# Patient Record
Sex: MALE | Race: OTHER | NOT HISPANIC OR LATINO | Employment: UNEMPLOYED | URBAN - METROPOLITAN AREA
[De-identification: names, ages, dates, MRNs, and addresses within clinical notes are randomized per-mention and may not be internally consistent; named-entity substitution may affect disease eponyms.]

---

## 2022-09-21 DIAGNOSIS — M54.50 CHRONIC LUMBAR PAIN: Primary | ICD-10-CM

## 2022-09-21 DIAGNOSIS — G89.29 CHRONIC LUMBAR PAIN: Primary | ICD-10-CM

## 2022-09-23 PROBLEM — M54.50 CHRONIC LUMBAR PAIN: Status: ACTIVE | Noted: 2022-09-23

## 2022-09-23 PROBLEM — G89.29 CHRONIC LUMBAR PAIN: Status: ACTIVE | Noted: 2022-09-23

## 2022-09-23 NOTE — PROGRESS NOTES
[facility]  Physical Therapy Initial Evaluation    Name: Leni White  [facility] Number: [id number]    Therapy Diagnosis:   Encounter Diagnosis   Name Primary?    Chronic low back pain, unspecified back pain laterality, unspecified whether sciatica present Yes     Physician: Kg Bowman FNP    Physician Orders: PT Eval and Treat  Medical Diagnosis from Referral: Chronic low back pain  Evaluation Date: 9/26/2022  Authorization Period Expiration: Per insurance approval  Plan of Care Expiration: 12/26/2022  Visit # / Visits authorized: 0/ Per insurance approval    Time In: 1108  Time Out: 1205  Total Appointment Time (timed & untimed codes): 57 minutes  Total Treatment time (time-based codes) separate from Evaluation: 27 minutes    Surgery: None  Orthopedic Precautions: None  Pertinent History: GERD;  has been told be three different ortho MD that surgical intervention is necessary    Subjective     Ly reports: Per chart review, referred over to PT for 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 Was also referred to neurosurgery however they did not accept his insurance, thus they are currently seeking alternate provider that does accept his insurance. Leg weakness has led to falls, thus was ordered a cane for stability by medical provider.    Subjective report today:  has been having neck and low back pain for years; vary in degree of severity, however today low back is most pressing. Most recent fall was 2 weeks ago;  has been falling for quite some time 2/2 weakness and pain in legs (left >right) thus medical provider ordered him an assistive device and he now uses a straight cane. Admits to tingling in both LUE and LLE, sometimes to right side as well however a lot less often and severe compared to left. Reports he is frequently dropping items, has decreased standing tolerance to average 5-10 minutes (varied based on his pain level), difficulty sleeping (sleeps  rotating sidelying, prone; unable to sleep supine 2/2 pain), headaches. Sometimes uses a brace-like device with ornelas/cold pack for his low back and that seems to help reduce pain. Has home cervical traction device, which he states is sometimes tolerable vs other times too painful. States his neck just feels heavy and hard to hold up. States he was told by MD that he has a lumbar disc sequestration and needs both cervical and lumbar spinal surgery but he does not want surgical intervention. Admits to intermittent dizziness that is onset by his headache and neck pain radiate further up his neck, blurry vision during high allergies; denies dysphagia, dysarthria, drop attacks, and saddle paresthesia. States he has 10# unintentional weight loss this year 2/2 loss of appetite lack of taste to food.     Medical History:   No past medical history on file.    Surgical History:   Leni White  has no past surgical history on file.    Medications:   Leni currently has no medications in their medication list.    Allergies:   Review of patient's allergies indicates:  Not on File     Imaging - States has had imaging done, will bring to PT session    Outcome Measure:  Eval: MDQ: 69.6% disability    CMS Impairment/Limitation/Restriction for FOTO Survey    Therapist reviewed FOTO scores for Leni White on 9/26/2022.   FOTO documents entered into [employer] - see Media section.    Limitation Score: 76% (24 score)                              Category: Body Position    Objective     Gait Analysis: use of straight cane with RUE, antalgic gait to LLE     Palpation    Tender to mild palpatory pressure left (scapula @ muscle belly likely RTC and traps, medial scapular border). Right same as above however less severe than left       Dermatomes    RUE and RLE intact to light touch. LUE and LLE decreased sensation to light touch     Reflexes    Babinkski: absent bilaterally  Right: patellar: 1+ and Achilles: 2+  Left: patellar: 2+ and Achilles: 2+      Myotomes    Right - knee flexion 5/5, knee extension 4/5, dorsiflexion 4/5, shoulder scaption 3/5 within arc of motion    Left - knee flexion 4/5, knee extension 2/5, dorsiflexion 3/5, shoulder scaption 2/5       AROM    Cervical spine limited bilateral lateral rotation (right more limited than left), bilateral lateral flexion. Left-sided neck pain induced with flexion, bilateral rotation, left lateral flexion, and extension. Right-sided neck pain induced with right lateral flexion. Central neck pain induced with flexion.    Lumbar spine not formally tested 2/2 high irritability and pain, however with LTR moderate to severe limitation going towards right (left spinal limitation)      Right shoulder 90 degrees active scaption, 100 degrees passive scaption, positive to all RTC testing, positive to resisted elbow flexion    Left shoulder 40 degrees active scaption, ~70-80 degrees passive scaption, positive to all RTC testing       Passive Accessory    -Cervical: pain with left lateral glides and overall decreased mobility    -Lumbar: deferred 2/2 high irritability and overall high pain level     Special Tests    SLR positive left for hamstring and calf tightness, may have some neural tension    RTC testing positive bilateral shoulders           TREATMENT     Ly received the treatments listed below:       Time Activities   Manual     TherAct     TherEx     Gait     Neuro Re-ed     Modalities     E-Stim     Dry Needling     Canalith Repositioning         Home Exercises and Patient Education Provided    Education provided:   -Plan of care, HEP, activity modification    Written Home Exercises Provided: yes.  Exercises were reviewed and Ly was able to demonstrate them prior to the end of the session.  Ly demonstrated fair  understanding of the education provided.     See EMR under Media for exercises provided 9/26/2022.    Assessment   Ly is a 47 y.o. male referred to outpatient Physical Therapy with a medical diagnosis  of low back and neck pain with radiculopathy. Patient presents with significant functional decline 2/2 weakness, impaired balance, high fall risk, and pain. Patient will benefit from skilled outpatient Physical Therapy to address the deficits stated above, provide education, and to maximize patient's level of independence.     Patient prognosis is Fair.     Plan of care discussed with patient: Yes  Patient's spiritual, cultural and educational needs considered and patient is agreeable to the plan of care and goals as stated below:     Anticipated Barriers for therapy: Language barrier    Goals:  Short Term Goals: 6 weeks   Patient will report at least 10% disability reduction on MDQ to indicate clinically significant functional improvement  Patient will be able to ambulate with antalgic gait patter for full day using assistive device  Patient will report at least 10 point increase on FOTO survey to indicate clinically significant functional improvement    Long Term Goals: 12 weeks   Patient will report at least 20% disability reduction on MDQ to indicate clinically significant functional improvement  Patient will progress ambulation to device-free for half day each day  Patient will report at least 20 point increase on FOTO survey to indicate clinically significant functional improvement    Plan   Plan of care Certification: 9/26/2022 to 12/26/2022.    Outpatient Physical Therapy 2-3 times weekly for 12 weeks to include the following interventions: Cervical/Lumbar Traction, Gait Training, Manual Therapy, Moist Heat/ Ice, Neuromuscular Re-ed, Patient Education, Self Care, Therapeutic Activities, and Therapeutic Exercise.     Walter Aaron, PT

## 2022-09-26 ENCOUNTER — CLINICAL SUPPORT (OUTPATIENT)
Dept: REHABILITATION | Facility: HOSPITAL | Age: 47
End: 2022-09-26
Payer: MEDICAID

## 2022-09-26 DIAGNOSIS — G89.29 CHRONIC LOW BACK PAIN, UNSPECIFIED BACK PAIN LATERALITY, UNSPECIFIED WHETHER SCIATICA PRESENT: Primary | ICD-10-CM

## 2022-09-26 DIAGNOSIS — M54.50 CHRONIC LOW BACK PAIN, UNSPECIFIED BACK PAIN LATERALITY, UNSPECIFIED WHETHER SCIATICA PRESENT: Primary | ICD-10-CM

## 2022-09-26 PROCEDURE — 97163 PT EVAL HIGH COMPLEX 45 MIN: CPT

## 2022-09-30 ENCOUNTER — CLINICAL SUPPORT (OUTPATIENT)
Dept: REHABILITATION | Facility: HOSPITAL | Age: 47
End: 2022-09-30
Payer: MEDICAID

## 2022-09-30 DIAGNOSIS — G89.29 CHRONIC NECK AND BACK PAIN: Primary | ICD-10-CM

## 2022-09-30 DIAGNOSIS — M54.2 CHRONIC NECK AND BACK PAIN: Primary | ICD-10-CM

## 2022-09-30 DIAGNOSIS — M54.9 CHRONIC NECK AND BACK PAIN: Primary | ICD-10-CM

## 2022-09-30 PROCEDURE — 97140 MANUAL THERAPY 1/> REGIONS: CPT

## 2022-09-30 PROCEDURE — 97110 THERAPEUTIC EXERCISES: CPT

## 2022-09-30 NOTE — PLAN OF CARE
Physical Therapy Treatment Note     Name: Leni White  Clinic Number: [id number]    Therapy Diagnosis: No diagnosis found.  Physician: Kg Bowman FNP    Visit Date: 9/30/2022    Physician Orders: PT Eval and Treat  Medical Diagnosis from Referral: Chronic low back pain  Evaluation Date: 9/26/2022  Authorization Period Expiration: 12/26/2022  Plan of Care Expiration: 12/26/2022  Visit # / Visits authorized: 1/24    Time In: 1206  Time Out: 1323  Total Billable Time: 77 minutes    Surgery: None  Orthopedic Precautions: None  Pertinent History: GERD; states has been told be three different ortho MD that surgical intervention is necessary    Subjective     Patient reports: Ambulating into clinic today with cane in right hand and significant left antalgic gait. Normally symptoms are left sided, however states he fell two weeks ago and right leg has been more painful than left. Wearing LSO.      CMS Impairment/Limitation/Restriction for FOTO Survey  Therapist reviewed FOTO scores for Leni White on 9/30/2022.   FOTO documents entered into [employer] - see Media section.    Eval Patient's Physical FS Primary Measure: 24  Eval Risk Adjusted Statistical FOTO: 45  Eval Limitation Score: 76%  Category: Body Position  Eval MDQ: 69.6% functional    Objective     Leni received the following treatment:     Time Activities   Manual 13 min Cervical manual traction, passive left hip stretch (HS, ER/IR groups, PF), passive bilateral leg extension for prone progression   TherAct     TherEx 64 min Prone on elbows, prone press ups (active, then passive, then passive with right closure), prone (HS curl, hip ext), LTR, SLR, supine clams, bridge, mechanical traction lumbar (32# high, 22# low; 4 step ramp up, 6 step ramp down)   Gait     Neuro Re-ed     Modalities     E-Stim     Dry Needling     Canalith Repositioning           Home Exercises Provided and Patient Education Provided     Education provided:   -Plan of care, HEP, pathology and  potential reasons for mechanical pain    Assessment     Prone progression or prone hip extension did not increase radicular symptoms, although prone right hamstring curl increased symptoms into RLE. Attempted mechanical lumbar traction, appears to have centralized symptoms to low back. Generalized exercises that promote spinal to help reduce stiffness/guarding, and improve functional mobility.    Patient prognosis is Fair.      Anticipated barriers to physical therapy: Language barrier    Goals: Ly Is progressing well towards his goals.  Short Term Goals: 6 weeks   Patient will report at least 10% disability reduction on MDQ to indicate clinically significant functional improvement  Patient will be able to ambulate with antalgic gait patter for full day using assistive device  Patient will report at least 10 point increase on FOTO survey to indicate clinically significant functional improvement     Long Term Goals: 12 weeks   Patient will report at least 20% disability reduction on MDQ to indicate clinically significant functional improvement  Patient will progress ambulation to device-free for half day each day  Patient will report at least 20 point increase on FOTO survey to indicate clinically significant functional improvement  Plan     2-3x/week x 12 weeks    Walter Aaron, PT

## 2022-10-03 ENCOUNTER — CLINICAL SUPPORT (OUTPATIENT)
Dept: REHABILITATION | Facility: HOSPITAL | Age: 47
End: 2022-10-03
Payer: MEDICAID

## 2022-10-03 DIAGNOSIS — M54.50 CHRONIC LOW BACK PAIN, UNSPECIFIED BACK PAIN LATERALITY, UNSPECIFIED WHETHER SCIATICA PRESENT: Primary | ICD-10-CM

## 2022-10-03 DIAGNOSIS — M54.2 CHRONIC NECK PAIN: ICD-10-CM

## 2022-10-03 DIAGNOSIS — R53.1 RIGHT SIDED WEAKNESS: ICD-10-CM

## 2022-10-03 DIAGNOSIS — G89.29 CHRONIC LOW BACK PAIN, UNSPECIFIED BACK PAIN LATERALITY, UNSPECIFIED WHETHER SCIATICA PRESENT: Primary | ICD-10-CM

## 2022-10-03 DIAGNOSIS — G89.29 CHRONIC NECK PAIN: ICD-10-CM

## 2022-10-03 PROCEDURE — 97530 THERAPEUTIC ACTIVITIES: CPT

## 2022-10-03 NOTE — PLAN OF CARE
Physical Therapy Treatment Note     Name: Leni White  Clinic Number: [id number]    Therapy Diagnosis:   Encounter Diagnoses   Name Primary?    Chronic low back pain, unspecified back pain laterality, unspecified whether sciatica present Yes    Chronic neck pain     Right sided weakness      Physician: Kg Bowman FNP    Visit Date: 10/3/2022    Physician Orders: PT Eval and Treat  Medical Diagnosis from Referral: Chronic low back pain  Evaluation Date: 9/26/2022  Authorization Period Expiration: 12/26/2022  Plan of Care Expiration: 12/26/2022  Visit # / Visits authorized: 2/24    Time In: 1100  Time Out: 1157  Total Billable Time: 57 minutes    Surgery: None  Orthopedic Precautions: None  Pertinent History: GERD; states has been told be three different ortho MD that surgical intervention is necessary    Subjective     Patient reports: Ambulating into clinic today with cane in right hand and significant left antalgic gait. Normally symptoms are left sided, however states he fell two weeks ago and right leg has been more painful than left. Wearing LSO.      CMS Impairment/Limitation/Restriction for FOTO Survey  Therapist reviewed FOTO scores for Leni White on 10/3/2022.   FOTO documents entered into [employer] - see Media section.    Eval Patient's Physical FS Primary Measure: 24  Eval Risk Adjusted Statistical FOTO: 45  Eval Limitation Score: 76%  Category: Body Position  Eval MDQ: 69.6% functional    Objective     Ly received the following treatment:     Time Activities   Manual  Cervical manual traction, passive left hip stretch (HS, ER/IR groups, PF), passive bilateral leg extension for prone progression   TherAct 57 min Lumbar mechanical traction, caregiver/patient education (40#high, 30# low; 3 step ramp up and down), Nustep   TherEx  Prone on elbows, prone press ups (active, then passive, then passive with right closure), prone (HS curl, hip ext), LTR, SLR, supine clams, bridge, mechanical traction lumbar (32#  high, 22# low; 4 step ramp up, 6 step ramp down)   Gait     Neuro Re-ed     Modalities     E-Stim     Dry Needling     Canalith Repositioning           Home Exercises Provided and Patient Education Provided     Education provided:   -Plan of care, HEP, pathology and potential reasons for mechanical pain, potential need for surgical intervention    Assessment     Extensive conversation regarding lumbar pathology and potential need for surgical intervention. Mr. 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 Has also tried spinal injection and gotten no relief from it. Continuing mechanical traction to help reduce spinal pressure; he is adamant of conservative measures and states he will consider surgery if he gets to the point of no longer being able to walk.    Patient prognosis is Fair.      Anticipated barriers to physical therapy: Language barrier    Goals: Leni Is progressing well towards his goals.  Short Term Goals: 6 weeks   Patient will report at least 10% disability reduction on MDQ to indicate clinically significant functional improvement  Patient will be able to ambulate with antalgic gait patter for full day using assistive device  Patient will report at least 10 point increase on FOTO survey to indicate clinically significant functional improvement     Long Term Goals: 12 weeks   Patient will report at least 20% disability reduction on MDQ to indicate clinically significant functional improvement  Patient will progress ambulation to device-free for half day each day  Patient will report at least 20 point increase on FOTO survey to indicate clinically  significant functional improvement  Plan     2-3x/week x 12 weeks    Walter Aaron, PT

## 2022-10-05 ENCOUNTER — CLINICAL SUPPORT (OUTPATIENT)
Dept: REHABILITATION | Facility: HOSPITAL | Age: 47
End: 2022-10-05
Payer: MEDICAID

## 2022-10-05 DIAGNOSIS — G89.29 CHRONIC NECK PAIN: ICD-10-CM

## 2022-10-05 DIAGNOSIS — M54.50 CHRONIC LOW BACK PAIN, UNSPECIFIED BACK PAIN LATERALITY, UNSPECIFIED WHETHER SCIATICA PRESENT: Primary | ICD-10-CM

## 2022-10-05 DIAGNOSIS — G89.29 CHRONIC LOW BACK PAIN, UNSPECIFIED BACK PAIN LATERALITY, UNSPECIFIED WHETHER SCIATICA PRESENT: Primary | ICD-10-CM

## 2022-10-05 DIAGNOSIS — M54.2 CHRONIC NECK PAIN: ICD-10-CM

## 2022-10-05 PROCEDURE — 97110 THERAPEUTIC EXERCISES: CPT

## 2022-10-05 PROCEDURE — 97140 MANUAL THERAPY 1/> REGIONS: CPT

## 2022-10-05 NOTE — PLAN OF CARE
Physical Therapy Treatment Note     Name: Leni White  Clinic Number: [id number]    Therapy Diagnosis:   Encounter Diagnoses   Name Primary?    Chronic low back pain, unspecified back pain laterality, unspecified whether sciatica present Yes    Chronic neck pain      Physician: Kg Bowman FNP    Visit Date: 10/5/2022    Physician Orders: PT Eval and Treat  Medical Diagnosis from Referral: Chronic low back pain  Evaluation Date: 9/26/2022  Authorization Period Expiration: 12/26/2022  Plan of Care Expiration: 12/26/2022  Visit # / Visits authorized: 3/24    Time In: 1058  Time Out: 1206  Total Billable Time: 68 minutes    Surgery: None  Orthopedic Precautions: None  Pertinent History: GERD; states has been told be three different ortho MD that surgical intervention is necessary    Subjective     Patient reports: No significant changes. Still feels low back pain in right radiating down left leg into left lateral calf.      CMS Impairment/Limitation/Restriction for FOTO Survey  Therapist reviewed FOTO scores for Leni White on 10/5/2022.   FOTO documents entered into [employer] - see Media section.    Eval Patient's Physical FS Primary Measure: 24  Eval Risk Adjusted Statistical FOTO: 45  Eval Limitation Score: 76%  Category: Body Position  Eval MDQ: 69.6% functional    Objective     Ly received the following treatment:     Time Activities   Manual 30 min Lumbar manual traction (stab ball), bilateral hamstring stretch (left with sciatic gliding/tensioning), left LE LAD manip, lumbar central PAs, lumbar MFR   TherAct  Lumbar mechanical traction, caregiver/patient education (40#high, 30# low; 3 step ramp up and down), Nustep   TherEx 38 min NuStep, MHP, prone press up assessment, DKTC, samurai core activation (90/90 hip knee flx, shld ext against knees), standing low back stretch, standing marching, HEP   Gait     Neuro Re-ed     Modalities     E-Stim     Dry Needling     Canalith Repositioning           Home Exercises  Provided and Patient Education Provided     Education provided:   -Plan of care, HEP (given and reviewed; see media section of EMR), pathology and potential reasons for mechanical pain, potential need for surgical intervention    Assessment     Flexion based activities centralize his symptoms, while extension based activities peripheralize symptoms to RLE. Has some short-lived relief with exercises/intervention, however right back to radicular symptoms once he stands upright and begins walking.     Extensive conversation regarding lumbar pathology and potential need for surgical intervention. Mr. 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 Has also tried spinal injection and gotten no relief from it. Continuing mechanical traction to help reduce spinal pressure; he is adamant of conservative measures and states he will consider surgery if he gets to the point of no longer being able to walk.    Patient prognosis is Fair.      Anticipated barriers to physical therapy: Language barrier    Goals: Leni Is progressing well towards his goals.  Short Term Goals: 6 weeks   Patient will report at least 10% disability reduction on MDQ to indicate clinically significant functional improvement  Patient will be able to ambulate with antalgic gait patter for full day using assistive device  Patient will report at least 10 point increase on FOTO survey to indicate clinically significant functional improvement     Long Term Goals: 12 weeks   Patient will report at least 20% disability reduction on MDQ to indicate clinically significant functional improvement  Patient will progress  ambulation to device-free for half day each day  Patient will report at least 20 point increase on FOTO survey to indicate clinically significant functional improvement  Plan     2-3x/week x 12 weeks    Walter Aaron, PT

## 2022-10-07 ENCOUNTER — CLINICAL SUPPORT (OUTPATIENT)
Dept: REHABILITATION | Facility: HOSPITAL | Age: 47
End: 2022-10-07
Payer: MEDICAID

## 2022-10-07 DIAGNOSIS — G89.29 CHRONIC LOW BACK PAIN, UNSPECIFIED BACK PAIN LATERALITY, UNSPECIFIED WHETHER SCIATICA PRESENT: Primary | ICD-10-CM

## 2022-10-07 DIAGNOSIS — M54.2 CHRONIC NECK PAIN: ICD-10-CM

## 2022-10-07 DIAGNOSIS — M54.50 CHRONIC LOW BACK PAIN, UNSPECIFIED BACK PAIN LATERALITY, UNSPECIFIED WHETHER SCIATICA PRESENT: Primary | ICD-10-CM

## 2022-10-07 DIAGNOSIS — G89.29 CHRONIC NECK PAIN: ICD-10-CM

## 2022-10-07 PROCEDURE — 97110 THERAPEUTIC EXERCISES: CPT

## 2022-10-07 PROCEDURE — 97140 MANUAL THERAPY 1/> REGIONS: CPT

## 2022-10-07 NOTE — PLAN OF CARE
Physical Therapy Treatment Note     Name: Leni White  Clinic Number: [id number]    Therapy Diagnosis:   Encounter Diagnoses   Name Primary?    Chronic low back pain, unspecified back pain laterality, unspecified whether sciatica present Yes    Chronic neck pain      Physician: Kg Bowman FNP    Visit Date: 10/7/2022    Physician Orders: PT Eval and Treat  Medical Diagnosis from Referral: Chronic low back pain  Evaluation Date: 9/26/2022  Authorization Period Expiration: 12/26/2022  Plan of Care Expiration: 12/26/2022  Visit # / Visits authorized: 4/24    Time In: 1055  Time Out: 1144  Total Billable Time: 49 minutes    Surgery: None  Orthopedic Precautions: None  Pertinent History: GERD; states has been told be three different ortho MD that surgical intervention is necessary    Subjective     Patient reports: No significant changes, however states right leg symptoms no longer radiate down entire leg but stop at the knee level. Had been doing a lot of home exercises. Biofreeze helped to give relief for a few hours.      CMS Impairment/Limitation/Restriction for FOTO Survey  Therapist reviewed FOTO scores for Leni White on 10/7/2022.   FOTO documents entered into [employer] - see Media section.    Eval Patient's Physical FS Primary Measure: 24  Eval Risk Adjusted Statistical FOTO: 45  Eval Limitation Score: 76%  Category: Body Position  Eval MDQ: 69.6% functional    Objective     Ly received the following treatment:     Time Activities   Manual 30 min Lumbar manual traction (stab ball), bilateral hamstring stretch (bilateral with sciatic gliding/tensioning), end range SKTC, Biofreeze low back and right scapula   TherAct  Lumbar mechanical traction, caregiver/patient education (40#high, 30# low; 3 step ramp up and down), Nustep   TherEx 19 min MHP, DKTC, stab ball pressdown, standing low back stretch, standing marching, HEP   Gait     Neuro Re-ed     Modalities     E-Stim     Dry Needling     Canalith Repositioning            Home Exercises Provided and Patient Education Provided     Education provided:   -Plan of care, HEP (given and reviewed; see media section of EMR), pathology and potential reasons for mechanical pain, potential need for surgical intervention    Assessment     Flexion based activities centralize his symptoms, while extension based activities peripheralize symptoms to RLE. Has some short-lived relief with exercises/intervention, however right back to radicular symptoms once he stands upright and begins walking.     Extensive conversation regarding lumbar pathology and potential need for surgical intervention. Mr. 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 Has also tried spinal injection and gotten no relief from it. Continuing mechanical traction to help reduce spinal pressure; he is adamant of conservative measures and states he will consider surgery if he gets to the point of no longer being able to walk.    Patient prognosis is Fair.      Anticipated barriers to physical therapy: Language barrier    Goals: Leni Is progressing well towards his goals.  Short Term Goals: 6 weeks   Patient will report at least 10% disability reduction on MDQ to indicate clinically significant functional improvement  Patient will be able to ambulate with antalgic gait patter for full day using assistive device  Patient will report at least 10 point increase on FOTO survey to indicate clinically significant functional improvement     Long Term Goals: 12 weeks   Patient will report at least 20% disability reduction on MDQ to indicate clinically significant functional  improvement  Patient will progress ambulation to device-free for half day each day  Patient will report at least 20 point increase on FOTO survey to indicate clinically significant functional improvement  Plan     2-3x/week x 12 weeks    Walter Aaron, PT

## 2022-10-10 ENCOUNTER — CLINICAL SUPPORT (OUTPATIENT)
Dept: REHABILITATION | Facility: HOSPITAL | Age: 47
End: 2022-10-10
Payer: MEDICAID

## 2022-10-10 DIAGNOSIS — M54.2 CHRONIC NECK PAIN: ICD-10-CM

## 2022-10-10 DIAGNOSIS — M54.50 CHRONIC LOW BACK PAIN, UNSPECIFIED BACK PAIN LATERALITY, UNSPECIFIED WHETHER SCIATICA PRESENT: Primary | ICD-10-CM

## 2022-10-10 DIAGNOSIS — R53.1 RIGHT SIDED WEAKNESS: ICD-10-CM

## 2022-10-10 DIAGNOSIS — G89.29 CHRONIC NECK PAIN: ICD-10-CM

## 2022-10-10 DIAGNOSIS — G89.29 CHRONIC LOW BACK PAIN, UNSPECIFIED BACK PAIN LATERALITY, UNSPECIFIED WHETHER SCIATICA PRESENT: Primary | ICD-10-CM

## 2022-10-10 PROCEDURE — 97140 MANUAL THERAPY 1/> REGIONS: CPT

## 2022-10-10 PROCEDURE — 97110 THERAPEUTIC EXERCISES: CPT

## 2022-10-10 NOTE — PLAN OF CARE
Physical Therapy Treatment Note     Name: Leni White  Clinic Number: [id number]    Therapy Diagnosis:   Encounter Diagnoses   Name Primary?    Chronic low back pain, unspecified back pain laterality, unspecified whether sciatica present Yes    Chronic neck pain     Right sided weakness      Physician: Kg Bowman FNP    Visit Date: 10/10/2022    Physician Orders: PT Eval and Treat  Medical Diagnosis from Referral: Chronic low back pain  Evaluation Date: 9/26/2022  Authorization Period Expiration: 12/26/2022  Plan of Care Expiration: 12/26/2022  Visit # / Visits authorized: 5/24    Time In: 1053  Time Out: 1142  Total Billable Time: 49 minutes    Surgery: None  Orthopedic Precautions: None  Pertinent History: GERD; states has been told be three different ortho MD that surgical intervention is necessary    Subjective     Patient reports: No significant changes, however states right leg symptoms no longer radiate down entire leg but stop at the calf/knee level. Has been doing a lot of home exercises, but the pain sometimes gets so bad that he has to stop.      CMS Impairment/Limitation/Restriction for FOTO Survey  Therapist reviewed FOTO scores for Leni White on 10/10/2022.   FOTO documents entered into [employer] - see Media section.    Eval Patient's Physical FS Primary Measure: 24  Eval Risk Adjusted Statistical FOTO: 45  Eval Limitation Score: 76%  Category: Body Position  Eval MDQ: 69.6% functional    Objective     Ly received the following treatment:     Time Activities   Manual 20 min Lumbar manual traction (stab ball), bilateral hamstring stretch (right with sciatic gliding/tensioning), passive LTR (stab ball)   TherAct  Lumbar mechanical traction, caregiver/patient education (40#high, 30# low; 3 step ramp up and down), Nustep   TherEx 29 min NuStep, MHP, DKTC, stab ball pressdown   Gait     Neuro Re-ed     Modalities     E-Stim     Dry Needling     Canalith Repositioning           Home Exercises Provided and  Patient Education Provided     Education provided:   -Plan of care, HEP (given and reviewed; see media section of EMR), pathology and potential reasons for mechanical pain, potential need for surgical intervention    Assessment     Flexion based activities centralize his symptoms, while extension based activities peripheralize symptoms to RLE. Has some short-lived relief with exercises/intervention, however right back to radicular symptoms once he stands upright and begins walking.     Extensive conversation regarding lumbar pathology and potential need for surgical intervention. Mr. 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 Has also tried spinal injection and gotten no relief from it. Continuing mechanical traction to help reduce spinal pressure; he is adamant of conservative measures and states he will consider surgery if he gets to the point of no longer being able to walk.    Patient prognosis is Fair.      Anticipated barriers to physical therapy: Language barrier    Goals: Leni Is progressing well towards his goals.  Short Term Goals: 6 weeks   Patient will report at least 10% disability reduction on MDQ to indicate clinically significant functional improvement  Patient will be able to ambulate with antalgic gait patter for full day using assistive device  Patient will report at least 10 point increase on FOTO survey to indicate clinically significant functional improvement     Long Term Goals: 12 weeks   Patient will report at least 20% disability reduction on MDQ to indicate clinically significant functional improvement  Patient will progress ambulation to  device-free for half day each day  Patient will report at least 20 point increase on FOTO survey to indicate clinically significant functional improvement  Plan     2-3x/week x 12 weeks    Walter Aaron, PT

## 2022-10-12 ENCOUNTER — CLINICAL SUPPORT (OUTPATIENT)
Dept: REHABILITATION | Facility: HOSPITAL | Age: 47
End: 2022-10-12
Payer: MEDICAID

## 2022-10-12 DIAGNOSIS — R53.1 RIGHT SIDED WEAKNESS: ICD-10-CM

## 2022-10-12 DIAGNOSIS — G89.29 CHRONIC LOW BACK PAIN, UNSPECIFIED BACK PAIN LATERALITY, UNSPECIFIED WHETHER SCIATICA PRESENT: Primary | ICD-10-CM

## 2022-10-12 DIAGNOSIS — M54.50 CHRONIC LOW BACK PAIN, UNSPECIFIED BACK PAIN LATERALITY, UNSPECIFIED WHETHER SCIATICA PRESENT: Primary | ICD-10-CM

## 2022-10-12 DIAGNOSIS — M54.2 CHRONIC NECK PAIN: ICD-10-CM

## 2022-10-12 DIAGNOSIS — G89.29 CHRONIC NECK PAIN: ICD-10-CM

## 2022-10-12 PROCEDURE — 97110 THERAPEUTIC EXERCISES: CPT

## 2022-10-12 NOTE — PLAN OF CARE
Physical Therapy Treatment Note     Name: Leni White  Clinic Number: [id number]    Therapy Diagnosis:   Encounter Diagnoses   Name Primary?    Chronic low back pain, unspecified back pain laterality, unspecified whether sciatica present Yes    Chronic neck pain     Right sided weakness      Physician: Kg Bowman FNP    Visit Date: 10/12/2022    Physician Orders: PT Eval and Treat  Medical Diagnosis from Referral: Chronic low back pain  Evaluation Date: 9/26/2022  Authorization Period Expiration: 12/26/2022  Plan of Care Expiration: 12/26/2022  Visit # / Visits authorized: 6/24    Time In: 1055  Time Out: 1145  Total Billable Time: 50 minutes    Surgery: None  Orthopedic Precautions: None  Pertinent History: GERD; states has been told be three different ortho MD that surgical intervention is necessary    Subjective     Patient reports: No significant changes, however states right leg symptoms no longer radiate down entire leg but stop at the calf/knee level. Pain is more concentrated to central lower back, and worsening in severity at that location.      CMS Impairment/Limitation/Restriction for FOTO Survey  Therapist reviewed FOTO scores for Leni White on 10/12/2022.   FOTO documents entered into [employer] - see Media section.    Eval Patient's Physical FS Primary Measure: 24  Eval Risk Adjusted Statistical FOTO: 45  Eval Limitation Score: 76%  Category: Body Position  Eval MDQ: 69.6% functional    10/12/22 Patient's Physical FS Primary Measure: 26  10/12/22 Risk Adjusted Statistical FOTO: 45  10/12/22 Limitation Score: 74%  Category: Body Position  10/12/22 MDQ: 67.6% functional    Objective     Leni received the following treatment:     Time Activities   Manual  Lumbar manual traction (stab ball), bilateral hamstring stretch (right with sciatic gliding/tensioning), passive LTR (stab ball)   TherAct  Lumbar mechanical traction, caregiver/patient education (40#high, 30# low; 3 step ramp up and down), Nustep   TherEx 40  min NuStep, MHP, quadruped (leg ext, arm ext, child's pose), stab ball pressdown, SLR w/ SBPD, Biofreeze to upper and lower back   Gait     Neuro Re-ed     Modalities     E-Stim     Dry Needling     Canalith Repositioning           Home Exercises Provided and Patient Education Provided     Education provided:   -Plan of care, HEP (given and reviewed; see media section of EMR), pathology and potential reasons for mechanical pain, potential need for surgical intervention    Assessment     Flexion based activities centralize his symptoms, while extension based activities peripheralize symptoms to RLE. Has some short-lived relief with exercises/intervention, however right back to radicular symptoms once he stands upright and begins walking. Targeting static lumbar stabilization to minimize excessive spinal movements with ADLs. Although flexion-based activities better suit him, they sometimes peripheralize (less than extension based activities) symptoms.    Extensive conversation regarding lumbar pathology and potential need for surgical intervention. Mr. 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 Has also tried spinal injection and gotten no relief from it. Continuing mechanical traction to help reduce spinal pressure; he is adamant of conservative measures and states he will consider surgery if he gets to the point of no longer being able to walk.    Patient prognosis is Fair.      Anticipated barriers to physical therapy: Language barrier    Goals: Leni Is progressing well towards his goals.  Short Term Goals: 6 weeks   Patient will report at least 10% disability  reduction on MDQ to indicate clinically significant functional improvement  Patient will be able to ambulate with antalgic gait patter for full day using assistive device  Patient will report at least 10 point increase on FOTO survey to indicate clinically significant functional improvement     Long Term Goals: 12 weeks   Patient will report at least 20% disability reduction on MDQ to indicate clinically significant functional improvement  Patient will progress ambulation to device-free for half day each day  Patient will report at least 20 point increase on FOTO survey to indicate clinically significant functional improvement  Plan     2-3x/week x 12 weeks    Walter Aaron, PT

## 2022-10-13 ENCOUNTER — CLINICAL SUPPORT (OUTPATIENT)
Dept: REHABILITATION | Facility: HOSPITAL | Age: 47
End: 2022-10-13
Payer: MEDICAID

## 2022-10-13 DIAGNOSIS — M54.50 CHRONIC LOW BACK PAIN, UNSPECIFIED BACK PAIN LATERALITY, UNSPECIFIED WHETHER SCIATICA PRESENT: Primary | ICD-10-CM

## 2022-10-13 DIAGNOSIS — G89.29 CHRONIC LOW BACK PAIN, UNSPECIFIED BACK PAIN LATERALITY, UNSPECIFIED WHETHER SCIATICA PRESENT: Primary | ICD-10-CM

## 2022-10-13 DIAGNOSIS — M54.2 CHRONIC NECK PAIN: ICD-10-CM

## 2022-10-13 DIAGNOSIS — G89.29 CHRONIC NECK PAIN: ICD-10-CM

## 2022-10-13 DIAGNOSIS — R53.1 RIGHT SIDED WEAKNESS: ICD-10-CM

## 2022-10-13 PROCEDURE — 97110 THERAPEUTIC EXERCISES: CPT

## 2022-10-13 NOTE — PLAN OF CARE
Physical Therapy Treatment Note     Name: Leni White  Clinic Number: [id number]    Therapy Diagnosis:   Encounter Diagnoses   Name Primary?    Chronic low back pain, unspecified back pain laterality, unspecified whether sciatica present Yes    Chronic neck pain     Right sided weakness      Physician: Kg Bowman FNP    Visit Date: 10/13/2022    Physician Orders: PT Eval and Treat  Medical Diagnosis from Referral: Chronic low back pain  Evaluation Date: 9/26/2022  Authorization Period Expiration: 12/26/2022  Plan of Care Expiration: 12/26/2022  Visit # / Visits authorized: 7/24    Time In: 1054  Time Out: 1147  Total Billable Time: 53 minutes    Surgery: None  Orthopedic Precautions: None  Pertinent History: GERD; states has been told be three different ortho MD that surgical intervention is necessary    Subjective     Patient reports: No significant changes. Last night was hurting a lot however this morning its better.      CMS Impairment/Limitation/Restriction for FOTO Survey  Therapist reviewed FOTO scores for Leni White on 10/13/2022.   FOTO documents entered into [employer] - see Media section.    Eval Patient's Physical FS Primary Measure: 24  Eval Risk Adjusted Statistical FOTO: 45  Eval Limitation Score: 76%  Category: Body Position  Eval MDQ: 69.6% functional    10/12/22 Patient's Physical FS Primary Measure: 26  10/12/22 Risk Adjusted Statistical FOTO: 45  10/12/22 Limitation Score: 74%  Category: Body Position  10/12/22 MDQ: 67.6% functional    Objective     Leni received the following treatment:     Time Activities   Manual  Lumbar manual traction (stab ball), bilateral hamstring stretch (right with sciatic gliding/tensioning), passive LTR (stab ball)   TherAct  Lumbar mechanical traction, caregiver/patient education (40#high, 30# low; 3 step ramp up and down), Nustep   TherEx 53 min NuStep, MHP, quadruped (leg ext, arm ext, child's pose), stab ball pressdown, SLR w/ SBPD, Biofreeze to upper and lower back,  KT tape right low back   Gait     Neuro Re-ed     Modalities     E-Stim     Dry Needling     Canalith Repositioning           Home Exercises Provided and Patient Education Provided     Education provided:   -Plan of care, cessation of valsava during core-intensive activities    Assessment     Flexion based activities centralize his symptoms, while extension based activities peripheralize symptoms to RLE. Has some short-lived relief with exercises/intervention, however right back to radicular symptoms once he stands upright and begins walking. Targeting static lumbar stabilization to minimize excessive spinal movements with ADLs. Although flexion-based activities better suit him, they sometimes peripheralize (less than extension based activities) symptoms.    Extensive conversation regarding lumbar pathology and potential need for surgical intervention. Mr. 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 Has also tried spinal injection and gotten no relief from it. Continuing mechanical traction to help reduce spinal pressure; he is adamant of conservative measures and states he will consider surgery if he gets to the point of no longer being able to walk.    Patient prognosis is Fair.      Anticipated barriers to physical therapy: Language barrier    Goals: Leni Is progressing well towards his goals.  Short Term Goals: 6 weeks   Patient will report at least 10% disability reduction on MDQ to indicate clinically significant functional improvement  Patient will be able to ambulate with antalgic gait patter for full day using assistive device  Patient will report at least  10 point increase on FOTO survey to indicate clinically significant functional improvement     Long Term Goals: 12 weeks   Patient will report at least 20% disability reduction on MDQ to indicate clinically significant functional improvement  Patient will progress ambulation to device-free for half day each day  Patient will report at least 20 point increase on FOTO survey to indicate clinically significant functional improvement  Plan     2-3x/week x 12 weeks    Walter Aaron, PT

## 2022-10-17 ENCOUNTER — CLINICAL SUPPORT (OUTPATIENT)
Dept: REHABILITATION | Facility: HOSPITAL | Age: 47
End: 2022-10-17
Payer: MEDICAID

## 2022-10-17 DIAGNOSIS — G89.29 CHRONIC LOW BACK PAIN, UNSPECIFIED BACK PAIN LATERALITY, UNSPECIFIED WHETHER SCIATICA PRESENT: Primary | ICD-10-CM

## 2022-10-17 DIAGNOSIS — G89.29 CHRONIC NECK PAIN: ICD-10-CM

## 2022-10-17 DIAGNOSIS — M62.81 MUSCLE WEAKNESS (GENERALIZED): ICD-10-CM

## 2022-10-17 DIAGNOSIS — M54.50 CHRONIC LOW BACK PAIN, UNSPECIFIED BACK PAIN LATERALITY, UNSPECIFIED WHETHER SCIATICA PRESENT: Primary | ICD-10-CM

## 2022-10-17 DIAGNOSIS — M54.2 CHRONIC NECK PAIN: ICD-10-CM

## 2022-10-17 PROCEDURE — 97140 MANUAL THERAPY 1/> REGIONS: CPT

## 2022-10-17 PROCEDURE — 97110 THERAPEUTIC EXERCISES: CPT

## 2022-10-17 NOTE — PLAN OF CARE
Physical Therapy Treatment Note     Name: Leni White  Clinic Number: [id number]    Therapy Diagnosis:   Encounter Diagnoses   Name Primary?    Chronic low back pain, unspecified back pain laterality, unspecified whether sciatica present Yes    Muscle weakness (generalized)     Chronic neck pain      Physician: Kg Bowman FNP    Visit Date: 10/17/2022    Physician Orders: PT Eval and Treat  Medical Diagnosis from Referral: Chronic low back pain  Evaluation Date: 2022  Authorization Period Expiration: 2022  Plan of Care Expiration: 2022  Visit # / Visits authorized:     Time In: 1054  Time Out: 1140  Total Billable Time: 46 minutes    Surgery: None  Orthopedic Precautions: None  Pertinent History: GERD; states has been told be three different ortho MD that surgical intervention is necessary    Subjective     Patient reports: No significant changes. Symptoms going down right leg but stop at knee/thigh rather than down entire LE. States has been doing some traction stretches at home.    Informed consent obtained after thorough explanation of risks and benefits. Pre-procedure time out performed which included verification of name, , and site of treatment. 70% isopropyl alcohol wipe down performed to treatment site with single use sterile prep pads.    CMS Impairment/Limitation/Restriction for FOTO Survey  Therapist reviewed FOTO scores for Leni White on 10/17/2022.   FOTO documents entered into [employer] - see Media section.    Eval Patient's Physical FS Primary Measure: 24  Eval Risk Adjusted Statistical FOTO: 45  Eval Limitation Score: 76%  Category: Body Position  Eval MDQ: 69.6% functional    10/12/22 Patient's Physical FS Primary Measure: 26  10/12/22 Risk Adjusted Statistical FOTO: 45  10/12/22 Limitation Score: 74%  Category: Body Position  10/12/22 MDQ: 67.6% functional    Objective     Leni received the following treatment:    Informed consent obtained after thorough explanation of risks and  benefits. Signed consent form will be scanned into medical record. Pre-procedure time out performed which included verification of name, , and site of treatment. 70% isopropyl alcohol wipe down performed to treatment site with single use sterile prep pads.     Time Activities   Manual 21 min Lumbar MFR and STM entire lumbar spine      TDN done to right lumbar multifidi/longissimus thoracis at L2 using 1 [employer] 0.30 x 50mm with situ technique     TherAct  NuStep, MHP, quadruped (child's pose), TDN   TherEx 25 min NuStep, MHP, quadruped (child's pose)   Gait     Neuro Re-ed     Modalities     E-Stim     Dry Needling     Canalith Repositioning           Home Exercises Provided and Patient Education Provided     Education provided:   -Plan of care, cessation of valsava during core-intensive activities    Assessment     Significant guarding during TDN, thus unable to utilize full effect.    Flexion based activities centralize his symptoms, while extension based activities peripheralize symptoms to RLE. Has some short-lived relief with exercises/intervention, however right back to radicular symptoms once he stands upright and begins walking. Targeting static lumbar stabilization to minimize excessive spinal movements with ADLs. Although flexion-based activities better suit him, they sometimes peripheralize (less than extension based activities) symptoms.    Extensive conversation regarding lumbar pathology and potential need for surgical intervention. Mr. 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 Has also tried spinal injection and gotten no relief from it.  Continuing mechanical traction to help reduce spinal pressure; he is adamant of conservative measures and states he will consider surgery if he gets to the point of no longer being able to walk.    Patient prognosis is Fair.      Anticipated barriers to physical therapy: Language barrier    Goals: Ly Is progressing well towards his goals.  Short Term Goals: 6 weeks   Patient will report at least 10% disability reduction on MDQ to indicate clinically significant functional improvement  Patient will be able to ambulate with antalgic gait patter for full day using assistive device  Patient will report at least 10 point increase on FOTO survey to indicate clinically significant functional improvement     Long Term Goals: 12 weeks   Patient will report at least 20% disability reduction on MDQ to indicate clinically significant functional improvement  Patient will progress ambulation to device-free for half day each day  Patient will report at least 20 point increase on FOTO survey to indicate clinically significant functional improvement  Plan     2-3x/week x 12 weeks    Walter Aaron, PT

## 2022-10-20 ENCOUNTER — CLINICAL SUPPORT (OUTPATIENT)
Dept: REHABILITATION | Facility: HOSPITAL | Age: 47
End: 2022-10-20
Payer: MEDICAID

## 2022-10-20 DIAGNOSIS — R53.1 RIGHT SIDED WEAKNESS: ICD-10-CM

## 2022-10-20 DIAGNOSIS — G89.29 CHRONIC NECK AND BACK PAIN: Primary | ICD-10-CM

## 2022-10-20 DIAGNOSIS — M54.9 CHRONIC NECK AND BACK PAIN: Primary | ICD-10-CM

## 2022-10-20 DIAGNOSIS — M54.2 CHRONIC NECK AND BACK PAIN: Primary | ICD-10-CM

## 2022-10-20 PROCEDURE — 97110 THERAPEUTIC EXERCISES: CPT

## 2022-10-20 PROCEDURE — 97140 MANUAL THERAPY 1/> REGIONS: CPT

## 2022-10-20 NOTE — PLAN OF CARE
Physical Therapy Treatment Note     Name: Leni White  Clinic Number: [id number]    Therapy Diagnosis:   No diagnosis found.    Physician: Kg Bowman FNP    Visit Date: 10/20/2022    Physician Orders: PT Eval and Treat  Medical Diagnosis from Referral: Chronic low back pain  Evaluation Date: 9/26/2022  Authorization Period Expiration: 12/26/2022  Plan of Care Expiration: 12/26/2022  Visit # / Visits authorized: 9/24    Time In: 1258  Time Out: 1353  Total Billable Time: 55 minutes    Surgery: None  Orthopedic Precautions: None  Pertinent History: GERD; states has been told be three different ortho MD that surgical intervention is necessary    Subjective     Patient reports: No significant changes. TDN led to significant increase in low back pain that night.    CMS Impairment/Limitation/Restriction for FOTO Survey  Therapist reviewed FOTO scores for Leni White on 10/20/2022.   FOTO documents entered into [employer] - see Media section.    Eval Patient's Physical FS Primary Measure: 24  Eval Risk Adjusted Statistical FOTO: 45  Eval Limitation Score: 76%  Category: Body Position  Eval MDQ: 69.6% functional    10/12/22 Patient's Physical FS Primary Measure: 26  10/12/22 Risk Adjusted Statistical FOTO: 45  10/12/22 Limitation Score: 74%  Category: Body Position  10/12/22 MDQ: 67.6% functional    Objective     Leni received the following treatment:       Time Activities   Manual 30 min Lumbar MFR and STM entire lumbar spine, right sciatic nerve mobility (glides and tensioning via supine SLR), right passive hamstring stretch w/ glides, right LE LAD mobilizations     TherAct  NuStep, MHP, quadruped (child's pose), TDN   TherEx 25 min NuStep, MHP, quadruped (child's pose)   Gait     Neuro Re-ed     Modalities     E-Stim     Dry Needling     Canalith Repositioning           Home Exercises Provided and Patient Education Provided     Education provided:   -Plan of care, cessation of valsava during core-intensive  activities    Assessment     Flexion based activities centralize his symptoms, while extension based activities peripheralize symptoms to RLE. Has some short-lived relief with exercises/intervention, however right back to radicular symptoms once he stands upright and begins walking. Targeting static lumbar stabilization to minimize excessive spinal movements with ADLs. Although flexion-based activities better suit him, they sometimes peripheralize (less than extension based activities) symptoms. Noted L5, S1 pattern of radiculopathy, also with significant neural tension posterior right leg.    Extensive conversation regarding lumbar pathology and potential need for surgical intervention. Mr. 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 Has also tried spinal injection and gotten no relief from it. Continuing mechanical traction to help reduce spinal pressure; he is adamant of conservative measures and states he will consider surgery if he gets to the point of no longer being able to walk.    Patient prognosis is Fair.      Anticipated barriers to physical therapy: Language barrier    Goals: Leni Is progressing well towards his goals.  Short Term Goals: 6 weeks   Patient will report at least 10% disability reduction on MDQ to indicate clinically significant functional improvement  Patient will be able to ambulate with antalgic gait patter for full day using assistive device  Patient will report at least 10 point increase on FOTO survey to indicate clinically significant functional improvement     Long Term Goals: 12 weeks   Patient will report at least 20% disability  reduction on MDQ to indicate clinically significant functional improvement  Patient will progress ambulation to device-free for half day each day  Patient will report at least 20 point increase on FOTO survey to indicate clinically significant functional improvement  Plan     2-3x/week x 12 weeks    Walter Aaron, PT

## 2022-10-24 ENCOUNTER — CLINICAL SUPPORT (OUTPATIENT)
Dept: REHABILITATION | Facility: HOSPITAL | Age: 47
End: 2022-10-24
Payer: MEDICAID

## 2022-10-24 DIAGNOSIS — R53.1 RIGHT SIDED WEAKNESS: ICD-10-CM

## 2022-10-24 DIAGNOSIS — M54.2 CHRONIC NECK AND BACK PAIN: Primary | ICD-10-CM

## 2022-10-24 DIAGNOSIS — M54.9 CHRONIC NECK AND BACK PAIN: Primary | ICD-10-CM

## 2022-10-24 DIAGNOSIS — G89.29 CHRONIC NECK AND BACK PAIN: Primary | ICD-10-CM

## 2022-10-24 PROCEDURE — 97014 ELECTRIC STIMULATION THERAPY: CPT

## 2022-10-24 PROCEDURE — 97110 THERAPEUTIC EXERCISES: CPT

## 2022-10-24 PROCEDURE — 97530 THERAPEUTIC ACTIVITIES: CPT

## 2022-10-24 NOTE — PLAN OF CARE
Physical Therapy Treatment Note     Name: Leni White  Clinic Number: [id number]    Therapy Diagnosis:   Encounter Diagnoses   Name Primary?    Chronic neck and back pain Yes    Right sided weakness        Physician: Kg Bowman FNP    Visit Date: 10/24/2022    Physician Orders: PT Eval and Treat  Medical Diagnosis from Referral: Chronic low back pain  Evaluation Date: 9/26/2022  Authorization Period Expiration: 12/26/2022  Plan of Care Expiration: 12/26/2022  Visit # / Visits authorized: 10/24    Time In: 1055  Time Out: 1148  Total Billable Time: 53 minutes    Surgery: None  Orthopedic Precautions: None  Pertinent History: GERD; states has been told be three different ortho MD that surgical intervention is necessary    Subjective     Patient reports: No significant changes. States that after PT he usually leaves in a lot of pain in his low back, however in between sessions and time off the pain is bearable.    CMS Impairment/Limitation/Restriction for FOTO Survey  Therapist reviewed FOTO scores for Leni White on 10/24/2022.   FOTO documents entered into [employer] - see Media section.    Eval Patient's Physical FS Primary Measure: 24  Eval Risk Adjusted Statistical FOTO: 45  Eval Limitation Score: 76%  Category: Body Position  Eval MDQ: 69.6% functional    10/12/22 Patient's Physical FS Primary Measure: 26  10/12/22 Risk Adjusted Statistical FOTO: 45  10/12/22 Limitation Score: 74%  Category: Body Position  10/12/22 MDQ: 67.6% functional    Objective     Leni received the following treatment:       Time Activities   Manual  Lumbar MFR and STM entire lumbar spine, right sciatic nerve mobility (glides and tensioning via supine SLR), right passive hamstring stretch w/ glides, right LE LAD mobilizations     TherAct  NuStep, MHP, quadruped (child's pose), TDN   TherEx 53 min NuStep, MHP, IFC to low back (bilateral paraspinals L5, right of paraspinals L5, right paraspinals L2-4)   Gait     Neuro Re-ed     Modalities      E-Stim     Dry Needling     Canalith Repositioning           Home Exercises Provided and Patient Education Provided     Education provided:   -Plan of care, cessation of valsava during core-intensive activities    Assessment     Significant right paraspinal spasm and tenderness to touch; this tension likely contributing to his pain and symptoms. Even with low load light exercises, pain post session remains high. Deconditioning of lumbar muscle group, core weakness, excessive guarding 2/2 pain likely. Responded well to treatment session today; no radiating pain into RLE post session.    Flexion based activities centralize his symptoms, while extension based activities peripheralize symptoms to RLE. Has some short-lived relief with exercises/intervention, however right back to radicular symptoms once he stands upright and begins walking. Targeting static lumbar stabilization to minimize excessive spinal movements with ADLs. Although flexion-based activities better suit him, they sometimes peripheralize (less than extension based activities) symptoms. Noted L5, S1 pattern of radiculopathy, also with significant neural tension posterior right leg.    Extensive conversation regarding lumbar pathology and potential need for surgical intervention. Mr. 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 Has also tried spinal injection and gotten no relief from it. Continuing mechanical traction to help reduce spinal pressure; he is adamant of conservative measures and states he will consider surgery if he gets to the point of no longer being able to walk.    Patient prognosis is  Fair.      Anticipated barriers to physical therapy: Language barrier    Goals: Ly Is progressing well towards his goals.  Short Term Goals: 6 weeks   Patient will report at least 10% disability reduction on MDQ to indicate clinically significant functional improvement  Patient will be able to ambulate with antalgic gait patter for full day using assistive device  Patient will report at least 10 point increase on FOTO survey to indicate clinically significant functional improvement     Long Term Goals: 12 weeks   Patient will report at least 20% disability reduction on MDQ to indicate clinically significant functional improvement  Patient will progress ambulation to device-free for half day each day  Patient will report at least 20 point increase on FOTO survey to indicate clinically significant functional improvement  Plan     2-3x/week x 12 weeks    Walter Aaron, PT

## 2022-10-26 ENCOUNTER — CLINICAL SUPPORT (OUTPATIENT)
Dept: REHABILITATION | Facility: HOSPITAL | Age: 47
End: 2022-10-26
Payer: MEDICAID

## 2022-10-26 DIAGNOSIS — G89.29 CHRONIC NECK AND BACK PAIN: Primary | ICD-10-CM

## 2022-10-26 DIAGNOSIS — R53.1 RIGHT SIDED WEAKNESS: ICD-10-CM

## 2022-10-26 DIAGNOSIS — M54.9 CHRONIC NECK AND BACK PAIN: Primary | ICD-10-CM

## 2022-10-26 DIAGNOSIS — M54.2 CHRONIC NECK AND BACK PAIN: Primary | ICD-10-CM

## 2022-10-26 PROCEDURE — 97110 THERAPEUTIC EXERCISES: CPT

## 2022-10-26 PROCEDURE — 97140 MANUAL THERAPY 1/> REGIONS: CPT

## 2022-10-26 NOTE — PLAN OF CARE
Physical Therapy Treatment Note     Name: Leni White  Clinic Number: [id number]    Therapy Diagnosis:   Encounter Diagnoses   Name Primary?    Chronic neck and back pain Yes    Right sided weakness        Physician: Kg Bowman FNP    Visit Date: 10/26/2022    Physician Orders: PT Eval and Treat  Medical Diagnosis from Referral: Chronic low back pain  Evaluation Date: 9/26/2022  Authorization Period Expiration: 12/26/2022  Plan of Care Expiration: 12/26/2022  Visit # / Visits authorized: 11/24    Time In: 1055  Time Out: 1144  Total Billable Time: 49 minutes    Surgery: None  Orthopedic Precautions: None  Pertinent History: GERD; states has been told be three different ortho MD that surgical intervention is necessary    Subjective     Patient reports: This morning woke up with significant low back pain (more than normal); didn't do anything that was out of the normal. He thinks it may be related to weather, which this morning came a cold front. States that after previous PT session he felt ok, didn't leave in increased pain like he usually does.    CMS Impairment/Limitation/Restriction for FOTO Survey  Therapist reviewed FOTO scores for Leni White on 10/26/2022.   FOTO documents entered into [employer] - see Media section.    Eval Patient's Physical FS Primary Measure: 24  Eval Risk Adjusted Statistical FOTO: 45  Eval Limitation Score: 76%  Category: Body Position  Eval MDQ: 69.6% functional    10/12/22 Patient's Physical FS Primary Measure: 26  10/12/22 Risk Adjusted Statistical FOTO: 45  10/12/22 Limitation Score: 74%  Category: Body Position  10/12/22 MDQ: 67.6% functional    Objective     Leni received the following treatment:       Time Activities   Manual 20 min Lumbar MFR and STM entire lumbar spine   TherAct  NuStep, MHP, quadruped (child's pose), TDN   TherEx 29 min MHP with IFC to low back (bilateral paraspinals L4/L5, bilateral paraspinals L2/L3, Biofreeze to low back   Gait     Neuro Re-ed     Modalities      E-Stim     Dry Needling     Canalith Repositioning           Home Exercises Provided and Patient Education Provided     Education provided:   -Plan of care, cessation of valsava during core-intensive activities    Assessment     Significant right paraspinal spasm and tenderness to touch; this tension likely contributing to his pain and symptoms. Even with low load light exercises, pain post session remains high. Deconditioning of lumbar muscle group, core weakness, excessive guarding 2/2 pain likely. Responded well to treatment session today; no radiating pain into RLE post session.    Flexion based activities centralize his symptoms, while extension based activities peripheralize symptoms to RLE. Has some short-lived relief with exercises/intervention, however right back to radicular symptoms once he stands upright and begins walking. Targeting static lumbar stabilization to minimize excessive spinal movements with ADLs. Although flexion-based activities better suit him, they sometimes peripheralize (less than extension based activities) symptoms. Noted L5, S1 pattern of radiculopathy, also with significant neural tension posterior right leg.    Extensive conversation regarding lumbar pathology and potential need for surgical intervention. Mr. 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 Has also tried spinal injection and gotten no relief from it. Continuing mechanical traction to help reduce spinal pressure; he is adamant of conservative measures and states he will consider surgery if he gets to the point of no longer being able to walk.    Patient prognosis is  Fair.      Anticipated barriers to physical therapy: Language barrier    Goals: Ly Is progressing well towards his goals.  Short Term Goals: 6 weeks   Patient will report at least 10% disability reduction on MDQ to indicate clinically significant functional improvement  Patient will be able to ambulate with antalgic gait patter for full day using assistive device  Patient will report at least 10 point increase on FOTO survey to indicate clinically significant functional improvement     Long Term Goals: 12 weeks   Patient will report at least 20% disability reduction on MDQ to indicate clinically significant functional improvement  Patient will progress ambulation to device-free for half day each day  Patient will report at least 20 point increase on FOTO survey to indicate clinically significant functional improvement  Plan     2-3x/week x 12 weeks    Walter Aaron, PT

## 2022-10-28 ENCOUNTER — CLINICAL SUPPORT (OUTPATIENT)
Dept: REHABILITATION | Facility: HOSPITAL | Age: 47
End: 2022-10-28
Payer: MEDICAID

## 2022-10-28 DIAGNOSIS — G89.29 CHRONIC NECK AND BACK PAIN: Primary | ICD-10-CM

## 2022-10-28 DIAGNOSIS — R53.1 RIGHT SIDED WEAKNESS: ICD-10-CM

## 2022-10-28 DIAGNOSIS — M54.9 CHRONIC NECK AND BACK PAIN: Primary | ICD-10-CM

## 2022-10-28 DIAGNOSIS — M54.2 CHRONIC NECK AND BACK PAIN: Primary | ICD-10-CM

## 2022-10-28 PROCEDURE — 97530 THERAPEUTIC ACTIVITIES: CPT

## 2022-10-28 PROCEDURE — 97110 THERAPEUTIC EXERCISES: CPT

## 2022-10-28 NOTE — PLAN OF CARE
Physical Therapy Treatment Note     Name: Leni White  Clinic Number: [id number]    Therapy Diagnosis:   Encounter Diagnoses   Name Primary?    Chronic neck and back pain Yes    Right sided weakness        Physician: Kg Bowman FNP    Visit Date: 10/28/2022    Physician Orders: PT Eval and Treat  Medical Diagnosis from Referral: Chronic low back pain  Evaluation Date: 9/26/2022  Authorization Period Expiration: 12/26/2022  Plan of Care Expiration: 12/26/2022  Visit # / Visits authorized: 12/24    Time In: 1105  Time Out: 1144  Total Billable Time: 39 minutes    Surgery: None  Orthopedic Precautions: None  Pertinent History: GERD; states has been told be three different ortho MD that surgical intervention is necessary    Subjective     Patient reports: This morning woke up with significant low back pain (more than normal); didn't do anything that was out of the normal. He thinks it may be related to weather, which this morning came a cold front. States that after previous PT session he felt ok, didn't leave in increased pain like he usually does.    CMS Impairment/Limitation/Restriction for FOTO Survey  Therapist reviewed FOTO scores for Leni White on 10/28/2022.   FOTO documents entered into [employer] - see Media section.    Eval Patient's Physical FS Primary Measure: 24  Eval Risk Adjusted Statistical FOTO: 45  Eval Limitation Score: 76%  Category: Body Position  Eval MDQ: 69.6% functional    10/12/22 Patient's Physical FS Primary Measure: 26  10/12/22 Risk Adjusted Statistical FOTO: 45  10/12/22 Limitation Score: 74%  Category: Body Position  10/12/22 MDQ: 67.6% functional    10/28/22 Patient's Physical FS Primary Measure: 24  10/28/22 Risk Adjusted Statistical FOTO: 45  10/28/22 Limitation Score: 76%  Category: Body Position  10/28/22 MDQ: 69.6% functional    Objective     Leni received the following treatment:       Time Activities   Manual  Lumbar MFR and STM entire lumbar spine   TherAct 24 min MHP w/ IFC to low  back   TherEx 15 min NuStep, Biofreeze to low back   Gait     Neuro Re-ed     Modalities     E-Stim     Dry Needling     Canalith Repositioning           Home Exercises Provided and Patient Education Provided     Education provided:   -Plan of care, cessation of valsava during core-intensive activities    Assessment     Significant right paraspinal spasm and tenderness to touch; this tension likely contributing to his pain and symptoms. Even with low load light exercises, pain post session remains high. Deconditioning of lumbar muscle group, core weakness, excessive guarding 2/2 pain likely. Responded well to treatment session today; no radiating pain into RLE post session.    Flexion based activities centralize his symptoms, while extension based activities peripheralize symptoms to RLE. Has some short-lived relief with exercises/intervention, however right back to radicular symptoms once he stands upright and begins walking. Targeting static lumbar stabilization to minimize excessive spinal movements with ADLs. Although flexion-based activities better suit him, they sometimes peripheralize (less than extension based activities) symptoms. Noted L5, S1 pattern of radiculopathy, also with significant neural tension posterior right leg.    Extensive conversation regarding lumbar pathology and potential need for surgical intervention. Mr. 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 Has also tried spinal injection and gotten no relief from it. Continuing mechanical traction to help reduce spinal pressure; he is adamant of conservative measures and states he will  consider surgery if he gets to the point of no longer being able to walk.    Patient prognosis is Fair.      Anticipated barriers to physical therapy: Language barrier    Goals: Ly Is progressing well towards his goals.  Short Term Goals: 6 weeks   Patient will report at least 10% disability reduction on MDQ to indicate clinically significant functional improvement  Patient will be able to ambulate with antalgic gait patter for full day using assistive device  Patient will report at least 10 point increase on FOTO survey to indicate clinically significant functional improvement     Long Term Goals: 12 weeks   Patient will report at least 20% disability reduction on MDQ to indicate clinically significant functional improvement  Patient will progress ambulation to device-free for half day each day  Patient will report at least 20 point increase on FOTO survey to indicate clinically significant functional improvement  Plan     2-3x/week x 12 weeks    Walter Aaron, PT      present x 4 quadrants

## 2022-10-31 ENCOUNTER — CLINICAL SUPPORT (OUTPATIENT)
Dept: REHABILITATION | Facility: HOSPITAL | Age: 47
End: 2022-10-31
Payer: MEDICAID

## 2022-10-31 DIAGNOSIS — M54.2 CHRONIC NECK AND BACK PAIN: Primary | ICD-10-CM

## 2022-10-31 DIAGNOSIS — R53.1 RIGHT SIDED WEAKNESS: ICD-10-CM

## 2022-10-31 DIAGNOSIS — M54.9 CHRONIC NECK AND BACK PAIN: Primary | ICD-10-CM

## 2022-10-31 DIAGNOSIS — G89.29 CHRONIC NECK AND BACK PAIN: Primary | ICD-10-CM

## 2022-10-31 PROCEDURE — 97110 THERAPEUTIC EXERCISES: CPT

## 2022-10-31 PROCEDURE — 97140 MANUAL THERAPY 1/> REGIONS: CPT

## 2022-10-31 NOTE — PLAN OF CARE
Physical Therapy Treatment Note     Name: Leni White  Clinic Number: [id number]    Therapy Diagnosis:   Encounter Diagnoses   Name Primary?    Chronic neck and back pain Yes    Right sided weakness        Physician: Kg Bowman FNP    Visit Date: 10/31/2022    Physician Orders: PT Eval and Treat  Medical Diagnosis from Referral: Chronic low back pain  Evaluation Date: 9/26/2022  Authorization Period Expiration: 12/26/2022  Plan of Care Expiration: 12/26/2022  Visit # / Visits authorized: 13/24    Time In: 1055  Time Out: 1148  Total Billable Time: 53 minutes    Surgery: None  Orthopedic Precautions: None  Pertinent History: GERD; states has been told be three different ortho MD that surgical intervention is necessary    Subjective     Patient reports: No changes. Rested over the weekend thus the back pain isn't severe. Neck feels tired.    CMS Impairment/Limitation/Restriction for FOTO Survey  Therapist reviewed FOTO scores for Leni White on 10/31/2022.   FOTO documents entered into [employer] - see Media section.    Eval Patient's Physical FS Primary Measure: 24  Eval Risk Adjusted Statistical FOTO: 45  Eval Limitation Score: 76%  Category: Body Position  Eval MDQ: 69.6% functional    10/12/22 Patient's Physical FS Primary Measure: 26  10/12/22 Risk Adjusted Statistical FOTO: 45  10/12/22 Limitation Score: 74%  Category: Body Position  10/12/22 MDQ: 67.6% functional    10/28/22 Patient's Physical FS Primary Measure: 24  10/28/22 Risk Adjusted Statistical FOTO: 45  10/28/22 Limitation Score: 76%  Category: Body Position  10/28/22 MDQ: 69.6% functional    Objective     Leni received the following treatment:       Time Activities   Manual 14 min Cervical manual traction, cervical glides (lateral mobilizations), left UT stretch, Biofreeze to low back (central) and left scapula   TherAct  MHP w/ IFC to low back   TherEx 39 min NuStep, seated scap retraction squeeze, neutral sitting, MHP w/ IFC to low back   Gait     Neuro  Re-ed     Modalities     E-Stim     Dry Needling     Canalith Repositioning           Home Exercises Provided and Patient Education Provided     Education provided:   -Plan of care, cessation of valsava during core-intensive activities    Assessment     Significant hypertonicity to left scapular muscles, and he is unable to isolate any movement. Unable to perform scap squeezes without upper trap activation, all active cervical movements were guarded and with shoulder shrug. State of hypertonicity is severe and he will need extensive education on relaxation to help reduce mechanical contribution to what is likely cervical stenosis.    Lumbar paraspinal spasm and tenderness to touch; this tension likely contributing to his pain and symptoms. Even with low load light exercises, pain post session remains high. Deconditioning of lumbar muscle group, core weakness, excessive guarding 2/2 pain likely. Responded well to treatment session today; no radiating pain into RLE post session.    Flexion based activities centralize his symptoms, while extension based activities peripheralize symptoms to RLE. Has some short-lived relief with exercises/intervention, however right back to radicular symptoms once he stands upright and begins walking. Targeting static lumbar stabilization to minimize excessive spinal movements with ADLs. Although flexion-based activities better suit him, they sometimes peripheralize (less than extension based activities) symptoms. Noted L5, S1 pattern of radiculopathy, also with significant neural tension posterior right leg.    Extensive conversation regarding lumbar pathology and potential need for surgical intervention. Mr. Farrar is unable to stand upright with severe pain in his back and to his right leg, thus suspect mechanical stenosis of some sort that has progressed (used to only be left sided); this paired with his full numbness to LLE, weakness in legs that leads to multiple falls, and time since  first diagnosed (2019), I feel that he would be much better suited with surgical intervention rather than conservative. Has also tried spinal injection and gotten no relief from it. Continuing mechanical traction to help reduce spinal pressure; he is adamant of conservative measures and states he will consider surgery if he gets to the point of no longer being able to walk.    Patient prognosis is Fair.      Anticipated barriers to physical therapy: Language barrier    Goals: Ly Is progressing well towards his goals.  Short Term Goals: 6 weeks   Patient will report at least 10% disability reduction on MDQ to indicate clinically significant functional improvement  Patient will be able to ambulate with antalgic gait patter for full day using assistive device  Patient will report at least 10 point increase on FOTO survey to indicate clinically significant functional improvement     Long Term Goals: 12 weeks   Patient will report at least 20% disability reduction on MDQ to indicate clinically significant functional improvement  Patient will progress ambulation to device-free for half day each day  Patient will report at least 20 point increase on FOTO survey to indicate clinically significant functional improvement  Plan     2-3x/week x 12 weeks    Walter Aaron, PT

## 2022-11-02 ENCOUNTER — CLINICAL SUPPORT (OUTPATIENT)
Dept: REHABILITATION | Facility: HOSPITAL | Age: 47
End: 2022-11-02
Payer: MEDICAID

## 2022-11-02 DIAGNOSIS — M54.2 CHRONIC NECK AND BACK PAIN: Primary | ICD-10-CM

## 2022-11-02 DIAGNOSIS — G62.9 NEUROPATHY: ICD-10-CM

## 2022-11-02 DIAGNOSIS — M54.9 CHRONIC NECK AND BACK PAIN: Primary | ICD-10-CM

## 2022-11-02 DIAGNOSIS — G89.29 CHRONIC NECK AND BACK PAIN: Primary | ICD-10-CM

## 2022-11-02 DIAGNOSIS — R53.1 RIGHT SIDED WEAKNESS: ICD-10-CM

## 2022-11-02 PROCEDURE — 97140 MANUAL THERAPY 1/> REGIONS: CPT

## 2022-11-02 PROCEDURE — 97110 THERAPEUTIC EXERCISES: CPT

## 2022-11-02 NOTE — PLAN OF CARE
Physical Therapy Treatment Note     Name: Leni White  Clinic Number: [id number]    Therapy Diagnosis:   Encounter Diagnoses   Name Primary?    Chronic neck and back pain Yes    Right sided weakness     Neuropathy        Physician: Kg Bowman FNP    Visit Date: 11/2/2022    Physician Orders: PT Eval and Treat  Medical Diagnosis from Referral: Chronic low back pain  Evaluation Date: 9/26/2022  Authorization Period Expiration: 12/26/2022  Plan of Care Expiration: 12/26/2022  Visit # / Visits authorized: 14/24    Time In: 1051  Time Out: 1131  Total Billable Time: 40 minutes    Surgery: None  Orthopedic Precautions: None  Pertinent History: GERD; states has been told be three different ortho MD that surgical intervention is necessary    Subjective     Patient reports: No changes. States manual therapy helped for his neck. Would like to do 2 session for back, 1 session for neck dispersed for 3x/week.    CMS Impairment/Limitation/Restriction for FOTO Survey  Therapist reviewed FOTO scores for Leni White on 11/2/2022.   FOTO documents entered into [employer] - see Media section.    Eval Patient's Physical FS Primary Measure: 24  Eval Risk Adjusted Statistical FOTO: 45  Eval Limitation Score: 76%  Category: Body Position  Eval MDQ: 69.6% functional    10/12/22 Patient's Physical FS Primary Measure: 26  10/12/22 Risk Adjusted Statistical FOTO: 45  10/12/22 Limitation Score: 74%  Category: Body Position  10/12/22 MDQ: 67.6% functional    10/28/22 Patient's Physical FS Primary Measure: 24  10/28/22 Risk Adjusted Statistical FOTO: 45  10/28/22 Limitation Score: 76%  Category: Body Position  10/28/22 MDQ: 69.6% functional    Objective     Leni received the following treatment:       Time Activities   Manual 14 min Cervical manual traction, left UT/scalenes stretch, Biofreeze to low back, neck and bilateral shoulder blades   TherAct  MHP w/ IFC to low back   TherEx 26 min NuStep, supine bilateral shld (horz abd, ext, chest press)    Gait     Neuro Re-ed     Modalities     E-Stim     Dry Needling     Canalith Repositioning           Home Exercises Provided and Patient Education Provided     Education provided:   -Plan of care, cessation of valsava during core-intensive activities    Assessment     Supine scapular activation improved as compared to seated, able to activate without upper trap use. Left UE weakness significant. State of hypertonicity is severe and he will need extensive education on relaxation to help reduce mechanical contribution to what is likely cervical stenosis.    Lumbar paraspinal spasm and tenderness to touch; this tension likely contributing to his pain and symptoms. Even with low load light exercises, pain post session remains high. Deconditioning of lumbar muscle group, core weakness, excessive guarding 2/2 pain likely. Responded well to treatment session today; no radiating pain into RLE post session.    Flexion based activities centralize his symptoms, while extension based activities peripheralize symptoms to RLE. Has some short-lived relief with exercises/intervention, however right back to radicular symptoms once he stands upright and begins walking. Targeting static lumbar stabilization to minimize excessive spinal movements with ADLs. Although flexion-based activities better suit him, they sometimes peripheralize (less than extension based activities) symptoms. Noted L5, S1 pattern of radiculopathy, also with significant neural tension posterior right leg.    Extensive conversation regarding lumbar pathology and potential need for surgical intervention. Mr. Farrar is unable to stand upright with severe pain in his back and to his right leg, thus suspect mechanical stenosis of some sort that has progressed (used to only be left sided); this paired with his full numbness to LLE, weakness in legs that leads to multiple falls, and time since first diagnosed (2019), I feel that he would be much better suited with  surgical intervention rather than conservative. Has also tried spinal injection and gotten no relief from it. Continuing mechanical traction to help reduce spinal pressure; he is adamant of conservative measures and states he will consider surgery if he gets to the point of no longer being able to walk.    Patient prognosis is Fair.      Anticipated barriers to physical therapy: Language barrier    Goals: Ly Is progressing well towards his goals.  Short Term Goals: 6 weeks   Patient will report at least 10% disability reduction on MDQ to indicate clinically significant functional improvement  Patient will be able to ambulate with antalgic gait patter for full day using assistive device  Patient will report at least 10 point increase on FOTO survey to indicate clinically significant functional improvement     Long Term Goals: 12 weeks   Patient will report at least 20% disability reduction on MDQ to indicate clinically significant functional improvement  Patient will progress ambulation to device-free for half day each day  Patient will report at least 20 point increase on FOTO survey to indicate clinically significant functional improvement  Plan     2-3x/week x 12 weeks    Walter Aaron, PT

## 2022-11-04 ENCOUNTER — CLINICAL SUPPORT (OUTPATIENT)
Dept: REHABILITATION | Facility: HOSPITAL | Age: 47
End: 2022-11-04
Payer: MEDICAID

## 2022-11-04 DIAGNOSIS — G62.9 NEUROPATHY: ICD-10-CM

## 2022-11-04 DIAGNOSIS — M54.9 CHRONIC NECK AND BACK PAIN: Primary | ICD-10-CM

## 2022-11-04 DIAGNOSIS — G89.29 CHRONIC NECK AND BACK PAIN: Primary | ICD-10-CM

## 2022-11-04 DIAGNOSIS — M54.2 CHRONIC NECK AND BACK PAIN: Primary | ICD-10-CM

## 2022-11-04 DIAGNOSIS — R29.3 POSTURE ABNORMALITY: ICD-10-CM

## 2022-11-04 PROCEDURE — 97530 THERAPEUTIC ACTIVITIES: CPT

## 2022-11-04 NOTE — PLAN OF CARE
Physical Therapy Treatment Note     Name: Leni White  Clinic Number: [id number]    Therapy Diagnosis:   Encounter Diagnoses   Name Primary?    Chronic neck and back pain Yes    Posture abnormality     Neuropathy        Physician: Kg Bowman FNP    Visit Date: 11/4/2022    Physician Orders: PT Eval and Treat  Medical Diagnosis from Referral: Chronic low back pain  Evaluation Date: 9/26/2022  Authorization Period Expiration: 12/26/2022  Plan of Care Expiration: 12/26/2022  Visit # / Visits authorized: 15/24    Time In: 1051  Time Out: 1137  Total Billable Time: 46 minutes    Surgery: None  Orthopedic Precautions: None  Pertinent History: GERD; states has been told be three different ortho MD that surgical intervention is necessary    Subjective     Patient reports: No changes. Back pain radiating into right leg.    CMS Impairment/Limitation/Restriction for FOTO Survey  Therapist reviewed FOTO scores for Leni White on 11/4/2022.   FOTO documents entered into [employer] - see Media section.    Eval Patient's Physical FS Primary Measure: 24  Eval Risk Adjusted Statistical FOTO: 45  Eval Limitation Score: 76%  Category: Body Position  Eval MDQ: 69.6% functional    10/12/22 Patient's Physical FS Primary Measure: 26  10/12/22 Risk Adjusted Statistical FOTO: 45  10/12/22 Limitation Score: 74%  Category: Body Position  10/12/22 MDQ: 67.6% functional    10/28/22 Patient's Physical FS Primary Measure: 24  10/28/22 Risk Adjusted Statistical FOTO: 45  10/28/22 Limitation Score: 76%  Category: Body Position  10/28/22 MDQ: 69.6% functional    Objective     Leni received the following treatment:       Time Activities   Manual  Cervical manual traction, left UT/scalenes stretch, Biofreeze to low back, neck and bilateral shoulder blades   TherAct 46 min MHP w/ IFC to low back, MHP w/ Russian stim to low back, NuStep   TherEx  NuStep, supine bilateral shld (horz abd, ext, chest press)   Gait     Neuro Re-ed     Modalities     E-Stim      Dry Needling     Canalith Repositioning           Home Exercises Provided and Patient Education Provided     Education provided:   -Plan of care, cessation of valsava during core-intensive activities    Assessment     Patient reported great improvement with Swedish stim; pain no longer radiating down his right leg. This may be a good way to help stimulate lumbar paraspinals and reduce hypertonicity leading to mechanical pain.    Supine scapular activation improved as compared to seated, able to activate without upper trap use. Left UE weakness significant. State of hypertonicity is severe and he will need extensive education on relaxation to help reduce mechanical contribution to what is likely cervical stenosis.    Lumbar paraspinal spasm and tenderness to touch; this tension likely contributing to his pain and symptoms. Even with low load light exercises, pain post session remains high. Deconditioning of lumbar muscle group, core weakness, excessive guarding 2/2 pain likely. Responded well to treatment session today; no radiating pain into RLE post session.    Flexion based activities centralize his symptoms, while extension based activities peripheralize symptoms to RLE. Has some short-lived relief with exercises/intervention, however right back to radicular symptoms once he stands upright and begins walking. Targeting static lumbar stabilization to minimize excessive spinal movements with ADLs. Although flexion-based activities better suit him, they sometimes peripheralize (less than extension based activities) symptoms. Noted L5, S1 pattern of radiculopathy, also with significant neural tension posterior right leg.    Extensive conversation regarding lumbar pathology and potential need for surgical intervention. Mr. Farrar is unable to stand upright with severe pain in his back and to his right leg, thus suspect mechanical stenosis of some sort that has progressed (used to only be left sided); this paired with  his full numbness to LLE, weakness in legs that leads to multiple falls, and time since first diagnosed (2019), I feel that he would be much better suited with surgical intervention rather than conservative. Has also tried spinal injection and gotten no relief from it. Continuing mechanical traction to help reduce spinal pressure; he is adamant of conservative measures and states he will consider surgery if he gets to the point of no longer being able to walk.    Patient prognosis is Fair.      Anticipated barriers to physical therapy: Language barrier    Goals: Ly Is progressing well towards his goals.  Short Term Goals: 6 weeks   Patient will report at least 10% disability reduction on MDQ to indicate clinically significant functional improvement  Patient will be able to ambulate with antalgic gait patter for full day using assistive device  Patient will report at least 10 point increase on FOTO survey to indicate clinically significant functional improvement     Long Term Goals: 12 weeks   Patient will report at least 20% disability reduction on MDQ to indicate clinically significant functional improvement  Patient will progress ambulation to device-free for half day each day  Patient will report at least 20 point increase on FOTO survey to indicate clinically significant functional improvement  Plan     2-3x/week x 12 weeks    Walter Aaron, PT

## 2022-11-07 ENCOUNTER — CLINICAL SUPPORT (OUTPATIENT)
Dept: REHABILITATION | Facility: HOSPITAL | Age: 47
End: 2022-11-07
Payer: MEDICAID

## 2022-11-07 DIAGNOSIS — M62.81 MUSCLE WEAKNESS (GENERALIZED): ICD-10-CM

## 2022-11-07 DIAGNOSIS — M54.2 CHRONIC NECK AND BACK PAIN: Primary | ICD-10-CM

## 2022-11-07 DIAGNOSIS — G62.9 NEUROPATHY: ICD-10-CM

## 2022-11-07 DIAGNOSIS — G89.29 CHRONIC NECK AND BACK PAIN: Primary | ICD-10-CM

## 2022-11-07 DIAGNOSIS — R29.3 POSTURE ABNORMALITY: ICD-10-CM

## 2022-11-07 DIAGNOSIS — M54.9 CHRONIC NECK AND BACK PAIN: Primary | ICD-10-CM

## 2022-11-07 PROCEDURE — 97140 MANUAL THERAPY 1/> REGIONS: CPT

## 2022-11-07 PROCEDURE — 97112 NEUROMUSCULAR REEDUCATION: CPT

## 2022-11-07 PROCEDURE — 97014 ELECTRIC STIMULATION THERAPY: CPT

## 2022-11-07 NOTE — PLAN OF CARE
Physical Therapy Treatment Note     Name: Leni White  Clinic Number: [id number]    Therapy Diagnosis:   Encounter Diagnoses   Name Primary?    Chronic neck and back pain Yes    Posture abnormality     Muscle weakness (generalized)     Neuropathy        Physician: Kg Bowman FNP    Visit Date: 11/7/2022    Physician Orders: PT Eval and Treat  Medical Diagnosis from Referral: Chronic low back pain  Evaluation Date: 9/26/2022  Authorization Period Expiration: 12/26/2022  Plan of Care Expiration: 12/26/2022  Visit # / Visits authorized: 16/24    Time In: 1054  Time Out: 1134  Total Billable Time: 40 minutes    Surgery: None  Orthopedic Precautions: None  Pertinent History: GERD; states has been told be three different ortho MD that surgical intervention is necessary    Subjective     Patient reports: No changes. Back pain radiating into right leg.    CMS Impairment/Limitation/Restriction for FOTO Survey  Therapist reviewed FOTO scores for Leni White on 11/7/2022.   FOTO documents entered into [employer] - see Media section.    Eval Patient's Physical FS Primary Measure: 24  Eval Risk Adjusted Statistical FOTO: 45  Eval Limitation Score: 76%  Category: Body Position  Eval MDQ: 69.6% functional    10/12/22 Patient's Physical FS Primary Measure: 26  10/12/22 Risk Adjusted Statistical FOTO: 45  10/12/22 Limitation Score: 74%  Category: Body Position  10/12/22 MDQ: 67.6% functional    10/28/22 Patient's Physical FS Primary Measure: 24  10/28/22 Risk Adjusted Statistical FOTO: 45  10/28/22 Limitation Score: 76%  Category: Body Position  10/28/22 MDQ: 69.6% functional    Objective     Leni received the following treatment:       Time Activities   Manual 13 min Lumbar rotation mobilizations (right gapping), left unilateral PAs (for right gapping), lumbar MFR and paraspinal STM, Biofreeze to low back and right scapula   TherAct     TherEx  NuStep, supine bilateral shld (horz abd, ext, chest press)   Gait     Neuro Re-ed 15 min MHP  w/ Russian stim to low back (performed prone HS curls, prone glute sets)   Modalities     E-Stim 12 min MHP w/ IFC to low back   Dry Needling     Canalith Repositioning           Home Exercises Provided and Patient Education Provided     Education provided:   -Plan of care, cessation of valsava during core-intensive activities    Assessment     Patient reported great improvement with Croatian stim; pain no longer radiating down his right leg. This may be a good way to help stimulate lumbar paraspinals and reduce hypertonicity leading to mechanical pain.    Supine scapular activation improved as compared to seated, able to activate without upper trap use. Left UE weakness significant. State of hypertonicity is severe and he will need extensive education on relaxation to help reduce mechanical contribution to what is likely cervical stenosis.    Lumbar paraspinal spasm and tenderness to touch; this tension likely contributing to his pain and symptoms. Even with low load light exercises, pain post session remains high. Deconditioning of lumbar muscle group, core weakness, excessive guarding 2/2 pain likely. Responded well to treatment session today; no radiating pain into RLE post session.    Flexion based activities centralize his symptoms, while extension based activities peripheralize symptoms to RLE. Has some short-lived relief with exercises/intervention, however right back to radicular symptoms once he stands upright and begins walking. Targeting static lumbar stabilization to minimize excessive spinal movements with ADLs. Although flexion-based activities better suit him, they sometimes peripheralize (less than extension based activities) symptoms. Noted L5, S1 pattern of radiculopathy, also with significant neural tension posterior right leg.    Extensive conversation regarding lumbar pathology and potential need for surgical intervention. Mr. Farrar is unable to stand upright with severe pain in his back and to his  right leg, thus suspect mechanical stenosis of some sort that has progressed (used to only be left sided); this paired with his full numbness to LLE, weakness in legs that leads to multiple falls, and time since first diagnosed (2019), I feel that he would be much better suited with surgical intervention rather than conservative. Has also tried spinal injection and gotten no relief from it. Continuing mechanical traction to help reduce spinal pressure; he is adamant of conservative measures and states he will consider surgery if he gets to the point of no longer being able to walk.    Patient prognosis is Fair.      Anticipated barriers to physical therapy: Language barrier    Goals: Ly Is progressing well towards his goals.  Short Term Goals: 6 weeks   Patient will report at least 10% disability reduction on MDQ to indicate clinically significant functional improvement  Patient will be able to ambulate with antalgic gait patter for full day using assistive device  Patient will report at least 10 point increase on FOTO survey to indicate clinically significant functional improvement     Long Term Goals: 12 weeks   Patient will report at least 20% disability reduction on MDQ to indicate clinically significant functional improvement  Patient will progress ambulation to device-free for half day each day  Patient will report at least 20 point increase on FOTO survey to indicate clinically significant functional improvement  Plan     2-3x/week x 12 weeks    Walter Aaron, PT

## 2022-11-09 ENCOUNTER — CLINICAL SUPPORT (OUTPATIENT)
Dept: REHABILITATION | Facility: HOSPITAL | Age: 47
End: 2022-11-09
Payer: MEDICAID

## 2022-11-09 DIAGNOSIS — G89.29 CHRONIC NECK AND BACK PAIN: Primary | ICD-10-CM

## 2022-11-09 DIAGNOSIS — M54.9 CHRONIC NECK AND BACK PAIN: Primary | ICD-10-CM

## 2022-11-09 DIAGNOSIS — R29.3 POSTURE ABNORMALITY: ICD-10-CM

## 2022-11-09 DIAGNOSIS — M54.2 CHRONIC NECK AND BACK PAIN: Primary | ICD-10-CM

## 2022-11-09 DIAGNOSIS — G62.9 NEUROPATHY: ICD-10-CM

## 2022-11-09 DIAGNOSIS — M62.81 MUSCLE WEAKNESS (GENERALIZED): ICD-10-CM

## 2022-11-09 PROCEDURE — 97140 MANUAL THERAPY 1/> REGIONS: CPT

## 2022-11-09 PROCEDURE — 97112 NEUROMUSCULAR REEDUCATION: CPT

## 2022-11-09 PROCEDURE — 97530 THERAPEUTIC ACTIVITIES: CPT

## 2022-11-09 NOTE — PLAN OF CARE
Physical Therapy Treatment Note     Name: Leni White  Clinic Number: [id number]    Therapy Diagnosis:   Encounter Diagnoses   Name Primary?    Chronic neck and back pain Yes    Muscle weakness (generalized)     Neuropathy     Posture abnormality        Physician: Kg Bowman FNP    Visit Date: 11/9/2022    Physician Orders: PT Eval and Treat  Medical Diagnosis from Referral: Chronic low back pain  Evaluation Date: 9/26/2022  Authorization Period Expiration: 12/26/2022  Plan of Care Expiration: 12/26/2022  Visit # / Visits authorized: 17/24    Time In: 1057  Time Out: 1146  Total Billable Time: 49 minutes    Surgery: None  Orthopedic Precautions: None  Pertinent History: GERD; states has been told be three different ortho MD that surgical intervention is necessary    Subjective     Patient reports: No changes. Would like to work on neck today.    CMS Impairment/Limitation/Restriction for FOTO Survey  Therapist reviewed FOTO scores for Leni White on 11/9/2022.   FOTO documents entered into [employer] - see Media section.    Eval Patient's Physical FS Primary Measure: 24  Eval Risk Adjusted Statistical FOTO: 45  Eval Limitation Score: 76%  Category: Body Position  Eval MDQ: 69.6% functional    10/12/22 Patient's Physical FS Primary Measure: 26  10/12/22 Risk Adjusted Statistical FOTO: 45  10/12/22 Limitation Score: 74%  Category: Body Position  10/12/22 MDQ: 67.6% functional    10/28/22 Patient's Physical FS Primary Measure: 24  10/28/22 Risk Adjusted Statistical FOTO: 45  10/28/22 Limitation Score: 76%  Category: Body Position  10/28/22 MDQ: 69.6% functional    Objective     Leni received the following treatment:       Time Activities   Manual (Lumbar)  Lumbar rotation mobilizations (right gapping), left unilateral PAs (for right gapping), lumbar MFR and paraspinal STM, Biofreeze to low back and right scapula   TherAct 15 min MHP w/ IFC to low back   TherEx     Manual (Cervical) 16 min Cervical manual traction, lateral  cervical glides entire spine, left stretch (levator, upper traps, scalenes), suboccipital release   Neuro Re-ed 18 min MHP w/ Russian stim to left (infraspinatus, supraspinatus, rhomboids) - performed with bilateral (band horz abd, band ER, chest press, serratus punch plus)   Dry Needling           Home Exercises Provided and Patient Education Provided     Education provided:   -Plan of care, cessation of valsava during core-intensive activities    Assessment     Patient reported great improvement with Burkinan stim; pain no longer radiating down his right leg. This may be a good way to help stimulate lumbar paraspinals and reduce hypertonicity leading to mechanical pain.    Supine scapular activation improved as compared to seated, able to activate without upper trap use. Left UE weakness significant. State of hypertonicity is severe and he will need extensive education on relaxation to help reduce mechanical contribution to what is likely cervical stenosis.    Lumbar paraspinal spasm and tenderness to touch; this tension likely contributing to his pain and symptoms. Even with low load light exercises, pain post session remains high. Deconditioning of lumbar muscle group, core weakness, excessive guarding 2/2 pain likely. Responded well to treatment session today; no radiating pain into RLE post session.    Flexion based activities centralize his symptoms, while extension based activities peripheralize symptoms to RLE. Has some short-lived relief with exercises/intervention, however right back to radicular symptoms once he stands upright and begins walking. Targeting static lumbar stabilization to minimize excessive spinal movements with ADLs. Although flexion-based activities better suit him, they sometimes peripheralize (less than extension based activities) symptoms. Noted L5, S1 pattern of radiculopathy, also with significant neural tension posterior right leg.    Extensive conversation regarding lumbar  pathology and potential need for surgical intervention. Mr. 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 Has also tried spinal injection and gotten no relief from it. Continuing mechanical traction to help reduce spinal pressure; he is adamant of conservative measures and states he will consider surgery if he gets to the point of no longer being able to walk.    Patient prognosis is Fair.      Anticipated barriers to physical therapy: Language barrier    Goals: Leni Is progressing well towards his goals.  Short Term Goals: 6 weeks   Patient will report at least 10% disability reduction on MDQ to indicate clinically significant functional improvement  Patient will be able to ambulate with antalgic gait patter for full day using assistive device  Patient will report at least 10 point increase on FOTO survey to indicate clinically significant functional improvement     Long Term Goals: 12 weeks   Patient will report at least 20% disability reduction on MDQ to indicate clinically significant functional improvement  Patient will progress ambulation to device-free for half day each day  Patient will report at least 20 point increase on FOTO survey to indicate clinically significant functional improvement  Plan     2-3x/week x 12 weeks    Walter Aaron, PT

## 2022-11-11 ENCOUNTER — CLINICAL SUPPORT (OUTPATIENT)
Dept: REHABILITATION | Facility: HOSPITAL | Age: 47
End: 2022-11-11
Payer: MEDICAID

## 2022-11-11 DIAGNOSIS — M54.2 CHRONIC NECK AND BACK PAIN: Primary | ICD-10-CM

## 2022-11-11 DIAGNOSIS — M54.9 CHRONIC NECK AND BACK PAIN: Primary | ICD-10-CM

## 2022-11-11 DIAGNOSIS — G89.29 CHRONIC NECK AND BACK PAIN: Primary | ICD-10-CM

## 2022-11-11 DIAGNOSIS — M62.81 MUSCLE WEAKNESS (GENERALIZED): ICD-10-CM

## 2022-11-11 DIAGNOSIS — G62.9 NEUROPATHY: ICD-10-CM

## 2022-11-11 DIAGNOSIS — R29.3 POSTURE ABNORMALITY: ICD-10-CM

## 2022-11-11 PROCEDURE — 97112 NEUROMUSCULAR REEDUCATION: CPT

## 2022-11-11 PROCEDURE — 97530 THERAPEUTIC ACTIVITIES: CPT

## 2022-11-11 NOTE — PLAN OF CARE
Physical Therapy Treatment Note     Name: Leni White  Clinic Number: [id number]    Therapy Diagnosis:   Encounter Diagnoses   Name Primary?    Chronic neck and back pain Yes    Neuropathy     Muscle weakness (generalized)     Posture abnormality        Physician: Kg Bowman FNP    Visit Date: 11/11/2022    Physician Orders: PT Eval and Treat  Medical Diagnosis from Referral: Chronic low back pain  Evaluation Date: 9/26/2022  Authorization Period Expiration: 12/26/2022  Plan of Care Expiration: 12/26/2022  Visit # / Visits authorized: 18/24    Time In: 1100  Time Out: 1145  Total Billable Time: 45 minutes    Surgery: None  Orthopedic Precautions: None  Pertinent History: GERD; states has been told be three different ortho MD that surgical intervention is necessary    Subjective     Patient reports: No changes. Would like to work on low back today.    CMS Impairment/Limitation/Restriction for FOTO Survey  Therapist reviewed FOTO scores for Leni White on 11/11/2022.   FOTO documents entered into [employer] - see Media section.    Eval Patient's Physical FS Primary Measure: 24  Eval Risk Adjusted Statistical FOTO: 45  Eval Limitation Score: 76%  Category: Body Position  Eval MDQ: 69.6% functional    10/12/22 Patient's Physical FS Primary Measure: 26  10/12/22 Risk Adjusted Statistical FOTO: 45  10/12/22 Limitation Score: 74%  Category: Body Position  10/12/22 MDQ: 67.6% functional    10/28/22 Patient's Physical FS Primary Measure: 24  10/28/22 Risk Adjusted Statistical FOTO: 45  10/28/22 Limitation Score: 76%  Category: Body Position  10/28/22 MDQ: 69.6% functional    11/11/22 Patient's Physical FS Primary Measure: 29  11/11/22 Risk Adjusted Statistical FOTO: 45  11/11/22 Limitation Score: 71%  Category: Body Position  11/11/22 MDQ: 64.3% functional    Objective     Ly received the following treatment:       Time Activities   Manual (Lumbar)  Lumbar rotation mobilizations (right gapping), left unilateral PAs (for right  gapping), lumbar MFR and paraspinal STM, Biofreeze to low back and right scapula   TherAct 20 min MHP w/ IFC to low back   TherEx  left (infraspinatus, supraspinatus, rhomboids) - performed with bilateral (band horz abd, band ER, chest press, serratus punch plus)       Manual (Cervical)  Cervical manual traction, lateral cervical glides entire spine, left stretch (levator, upper traps, scalenes), suboccipital release   Neuro Re-ed 25 min Russian stim to low back with (stab ball press, SLR, brace marching)   Dry Needling           Home Exercises Provided and Patient Education Provided     Education provided:   -Plan of care, cessation of valsava during core-intensive activities    Assessment     Patient reported great improvement with Spanish stim; pain no longer radiating down his right leg. This may be a good way to help stimulate lumbar paraspinals and reduce hypertonicity leading to mechanical pain.    Supine scapular activation improved as compared to seated, able to activate without upper trap use. Left UE weakness significant. State of hypertonicity is severe and he will need extensive education on relaxation to help reduce mechanical contribution to what is likely cervical stenosis.    Lumbar paraspinal spasm and tenderness to touch; this tension likely contributing to his pain and symptoms. Even with low load light exercises, pain post session remains high. Deconditioning of lumbar muscle group, core weakness, excessive guarding 2/2 pain likely. Responded well to treatment session today; no radiating pain into RLE post session.    Flexion based activities centralize his symptoms, while extension based activities peripheralize symptoms to RLE. Has some short-lived relief with exercises/intervention, however right back to radicular symptoms once he stands upright and begins walking. Targeting static lumbar stabilization to minimize excessive spinal movements with ADLs. Although flexion-based activities better  suit him, they sometimes peripheralize (less than extension based activities) symptoms. Noted L5, S1 pattern of radiculopathy, also with significant neural tension posterior right leg.    Extensive conversation regarding lumbar pathology and potential need for surgical intervention. Mr. 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 Has also tried spinal injection and gotten no relief from it. Continuing mechanical traction to help reduce spinal pressure; he is adamant of conservative measures and states he will consider surgery if he gets to the point of no longer being able to walk.    Patient prognosis is Fair.      Anticipated barriers to physical therapy: Language barrier    Goals: Leni Is progressing well towards his goals.  Short Term Goals: 6 weeks   Patient will report at least 10% disability reduction on MDQ to indicate clinically significant functional improvement  Patient will be able to ambulate with antalgic gait patter for full day using assistive device  Patient will report at least 10 point increase on FOTO survey to indicate clinically significant functional improvement     Long Term Goals: 12 weeks   Patient will report at least 20% disability reduction on MDQ to indicate clinically significant functional improvement  Patient will progress ambulation to device-free for half day each day  Patient will report at least 20 point increase on FOTO survey to indicate clinically significant functional improvement    Plan     2-3x/week x 12 weeks    Walter Aaron, PT

## 2022-11-14 ENCOUNTER — CLINICAL SUPPORT (OUTPATIENT)
Dept: REHABILITATION | Facility: HOSPITAL | Age: 47
End: 2022-11-14
Payer: MEDICAID

## 2022-11-14 DIAGNOSIS — R29.3 POSTURE ABNORMALITY: ICD-10-CM

## 2022-11-14 DIAGNOSIS — M54.9 CHRONIC NECK AND BACK PAIN: Primary | ICD-10-CM

## 2022-11-14 DIAGNOSIS — M54.2 CHRONIC NECK AND BACK PAIN: Primary | ICD-10-CM

## 2022-11-14 DIAGNOSIS — M62.81 MUSCLE WEAKNESS (GENERALIZED): ICD-10-CM

## 2022-11-14 DIAGNOSIS — G62.9 NEUROPATHY: ICD-10-CM

## 2022-11-14 DIAGNOSIS — G89.29 CHRONIC NECK AND BACK PAIN: Primary | ICD-10-CM

## 2022-11-14 PROCEDURE — 97530 THERAPEUTIC ACTIVITIES: CPT

## 2022-11-14 PROCEDURE — 97112 NEUROMUSCULAR REEDUCATION: CPT

## 2022-11-14 NOTE — PLAN OF CARE
Physical Therapy Treatment Note     Name: Leni White  Clinic Number: [id number]    Therapy Diagnosis:   Encounter Diagnoses   Name Primary?    Chronic neck and back pain Yes    Neuropathy     Muscle weakness (generalized)     Posture abnormality        Physician: Kg Bowman FNP    Visit Date: 11/14/2022    Physician Orders: PT Eval and Treat  Medical Diagnosis from Referral: Chronic low back pain  Evaluation Date: 9/26/2022  Authorization Period Expiration: 12/26/2022  Plan of Care Expiration: 12/26/2022  Visit # / Visits authorized: 19/24    Time In: 1054  Time Out: 1144  Total Billable Time: 50 minutes    Surgery: None  Orthopedic Precautions: None  Pertinent History: GERD; states has been told be three different ortho MD that surgical intervention is necessary    Subjective     Patient reports: No changes. Having increased back pain today, thus would like to work on low back today.    CMS Impairment/Limitation/Restriction for FOTO Survey  Therapist reviewed FOTO scores for Leni White on 11/14/2022.   FOTO documents entered into [employer] - see Media section.    Eval Patient's Physical FS Primary Measure: 24  Eval Risk Adjusted Statistical FOTO: 45  Eval Limitation Score: 76%  Category: Body Position  Eval MDQ: 69.6% functional    10/12/22 Patient's Physical FS Primary Measure: 26  10/12/22 Risk Adjusted Statistical FOTO: 45  10/12/22 Limitation Score: 74%  Category: Body Position  10/12/22 MDQ: 67.6% functional    10/28/22 Patient's Physical FS Primary Measure: 24  10/28/22 Risk Adjusted Statistical FOTO: 45  10/28/22 Limitation Score: 76%  Category: Body Position  10/28/22 MDQ: 69.6% functional    11/11/22 Patient's Physical FS Primary Measure: 29  11/11/22 Risk Adjusted Statistical FOTO: 45  11/11/22 Limitation Score: 71%  Category: Body Position  11/11/22 MDQ: 64.3% functional    Objective     Ly received the following treatment:       Time Activities   Manual (Lumbar)  Lumbar rotation mobilizations (right  gapping), left unilateral PAs (for right gapping), lumbar MFR and paraspinal STM, Biofreeze to low back and right scapula   TherAct 30 min MHP w/ IFC to low back, NuStep   TherEx  left (infraspinatus, supraspinatus, rhomboids) - performed with bilateral (band horz abd, band ER, chest press, serratus punch plus)       Manual (Cervical)  Cervical manual traction, lateral cervical glides entire spine, left stretch (levator, upper traps, scalenes), suboccipital release   Neuro Re-ed 20 min Russian stim to low back with (prone HS curl)   Dry Needling           Home Exercises Provided and Patient Education Provided     Education provided:   -Plan of care, cessation of valsava during core-intensive activities    Assessment     Patient reported great improvement with Kittitian stim; pain no longer radiating down his right leg. This may be a good way to help stimulate lumbar paraspinals and reduce hypertonicity leading to mechanical pain.    Supine scapular activation improved as compared to seated, able to activate without upper trap use. Left UE weakness significant. State of hypertonicity is severe and he will need extensive education on relaxation to help reduce mechanical contribution to what is likely cervical stenosis.    Lumbar paraspinal spasm and tenderness to touch; this tension likely contributing to his pain and symptoms. Even with low load light exercises, pain post session remains high. Deconditioning of lumbar muscle group, core weakness, excessive guarding 2/2 pain likely. Responded well to treatment session today; no radiating pain into RLE post session.    Flexion based activities centralize his symptoms, while extension based activities peripheralize symptoms to RLE. Has some short-lived relief with exercises/intervention, however right back to radicular symptoms once he stands upright and begins walking. Targeting static lumbar stabilization to minimize excessive spinal movements with ADLs. Although  flexion-based activities better suit him, they sometimes peripheralize (less than extension based activities) symptoms. Noted L5, S1 pattern of radiculopathy, also with significant neural tension posterior right leg.    Extensive conversation regarding lumbar pathology and potential need for surgical intervention. Mr. 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 Has also tried spinal injection and gotten no relief from it. Continuing mechanical traction to help reduce spinal pressure; he is adamant of conservative measures and states he will consider surgery if he gets to the point of no longer being able to walk.    Patient prognosis is Fair.      Anticipated barriers to physical therapy: Language barrier    Goals: Leni Is progressing well towards his goals.  Short Term Goals: 6 weeks   Patient will report at least 10% disability reduction on MDQ to indicate clinically significant functional improvement  Patient will be able to ambulate with antalgic gait patter for full day using assistive device  Patient will report at least 10 point increase on FOTO survey to indicate clinically significant functional improvement     Long Term Goals: 12 weeks   Patient will report at least 20% disability reduction on MDQ to indicate clinically significant functional improvement  Patient will progress ambulation to device-free for half day each day  Patient will report at least 20 point increase on FOTO survey to indicate clinically significant functional improvement    Plan     2-3x/week x 12 weeks    Walter Aaron, PT

## 2022-11-21 ENCOUNTER — CLINICAL SUPPORT (OUTPATIENT)
Dept: REHABILITATION | Facility: HOSPITAL | Age: 47
End: 2022-11-21
Payer: MEDICAID

## 2022-11-21 DIAGNOSIS — G89.29 CHRONIC NECK AND BACK PAIN: Primary | ICD-10-CM

## 2022-11-21 DIAGNOSIS — R29.3 POSTURE ABNORMALITY: ICD-10-CM

## 2022-11-21 DIAGNOSIS — G62.9 NEUROPATHY: ICD-10-CM

## 2022-11-21 DIAGNOSIS — M54.2 CHRONIC NECK AND BACK PAIN: Primary | ICD-10-CM

## 2022-11-21 DIAGNOSIS — M54.9 CHRONIC NECK AND BACK PAIN: Primary | ICD-10-CM

## 2022-11-21 DIAGNOSIS — M62.81 MUSCLE WEAKNESS (GENERALIZED): ICD-10-CM

## 2022-11-21 PROCEDURE — 97110 THERAPEUTIC EXERCISES: CPT

## 2022-11-21 PROCEDURE — 97530 THERAPEUTIC ACTIVITIES: CPT

## 2022-11-21 PROCEDURE — 97140 MANUAL THERAPY 1/> REGIONS: CPT

## 2022-11-21 NOTE — PLAN OF CARE
Physical Therapy Treatment Note     Name: Leni White  Clinic Number: [id number]    Therapy Diagnosis:   Encounter Diagnoses   Name Primary?    Chronic neck and back pain Yes    Neuropathy     Muscle weakness (generalized)     Posture abnormality        Physician: Kg Bowman FNP    Visit Date: 11/21/2022    Physician Orders: PT Eval and Treat  Medical Diagnosis from Referral: Chronic low back pain  Evaluation Date: 9/26/2022  Authorization Period Expiration: 12/26/2022  Plan of Care Expiration: 12/26/2022  Visit # / Visits authorized: 20/24    Time In: 1055  Time Out: 1150  Total Billable Time: 55 minutes    Surgery: None  Orthopedic Precautions: None  Pertinent History: GERD; states has been told be three different ortho MD that surgical intervention is necessary    Subjective     Patient reports: No changes. Would like to work on his neck today.    CMS Impairment/Limitation/Restriction for FOTO Survey  Therapist reviewed FOTO scores for Leni White on 11/21/2022.   FOTO documents entered into [employer] - see Media section.    Eval Patient's Physical FS Primary Measure: 24  Eval Risk Adjusted Statistical FOTO: 45  Eval Limitation Score: 76%  Category: Body Position  Eval MDQ: 69.6% functional    10/12/22 Patient's Physical FS Primary Measure: 26  10/12/22 Risk Adjusted Statistical FOTO: 45  10/12/22 Limitation Score: 74%  Category: Body Position  10/12/22 MDQ: 67.6% functional    10/28/22 Patient's Physical FS Primary Measure: 24  10/28/22 Risk Adjusted Statistical FOTO: 45  10/28/22 Limitation Score: 76%  Category: Body Position  10/28/22 MDQ: 69.6% functional    11/11/22 Patient's Physical FS Primary Measure: 29  11/11/22 Risk Adjusted Statistical FOTO: 45  11/11/22 Limitation Score: 71%  Category: Body Position  11/11/22 MDQ: 64.3% functional    Objective     Leni received the following treatment:       Time Activities   Manual (Lumbar)  Lumbar rotation mobilizations (right gapping), left unilateral PAs (for right  gapping), lumbar MFR and paraspinal STM, Biofreeze to low back and right scapula   TherAct 25 min MHP to neck   TherEx 12 min Chin tucks, chin tuck/head lift       Manual (Cervical) 18 min Cervical manual traction, lateral right cervical glides entire spine, left stretch (levator, upper traps, scalenes), suboccipital release, PT-resisted exercise (bilateral lat flx, flexion)   Neuro Re-ed  Russian stim to low back with (prone HS curl)   Dry Needling           Home Exercises Provided and Patient Education Provided     Education provided:   -Plan of care, cessation of valsava during core-intensive activities    Assessment     Patient reported great improvement with Malagasy stim; pain no longer radiating down his right leg. This may be a good way to help stimulate lumbar paraspinals and reduce hypertonicity leading to mechanical pain.    Supine scapular activation improved as compared to seated, able to activate without upper trap use. Left UE weakness significant. State of hypertonicity is severe and he will need extensive education on relaxation to help reduce mechanical contribution to what is likely cervical stenosis.    Lumbar paraspinal spasm and tenderness to touch; this tension likely contributing to his pain and symptoms. Even with low load light exercises, pain post session remains high. Deconditioning of lumbar muscle group, core weakness, excessive guarding 2/2 pain likely. Responded well to treatment session today; no radiating pain into RLE post session.    Flexion based activities centralize his symptoms, while extension based activities peripheralize symptoms to RLE. Has some short-lived relief with exercises/intervention, however right back to radicular symptoms once he stands upright and begins walking. Targeting static lumbar stabilization to minimize excessive spinal movements with ADLs. Although flexion-based activities better suit him, they sometimes peripheralize (less than extension based  activities) symptoms. Noted L5, S1 pattern of radiculopathy, also with significant neural tension posterior right leg.    Extensive conversation regarding lumbar pathology and potential need for surgical intervention. Mr. 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 Has also tried spinal injection and gotten no relief from it. Continuing mechanical traction to help reduce spinal pressure; he is adamant of conservative measures and states he will consider surgery if he gets to the point of no longer being able to walk.    Patient prognosis is Fair.      Anticipated barriers to physical therapy: Language barrier    Goals: Leni Is progressing well towards his goals.  Short Term Goals: 6 weeks   Patient will report at least 10% disability reduction on MDQ to indicate clinically significant functional improvement  Patient will be able to ambulate with antalgic gait patter for full day using assistive device  Patient will report at least 10 point increase on FOTO survey to indicate clinically significant functional improvement     Long Term Goals: 12 weeks   Patient will report at least 20% disability reduction on MDQ to indicate clinically significant functional improvement  Patient will progress ambulation to device-free for half day each day  Patient will report at least 20 point increase on FOTO survey to indicate clinically significant functional improvement    Plan     2-3x/week x 12 weeks    Walter Aaron, PT

## 2022-11-23 ENCOUNTER — CLINICAL SUPPORT (OUTPATIENT)
Dept: REHABILITATION | Facility: HOSPITAL | Age: 47
End: 2022-11-23
Payer: MEDICAID

## 2022-11-23 DIAGNOSIS — M62.81 MUSCLE WEAKNESS (GENERALIZED): ICD-10-CM

## 2022-11-23 DIAGNOSIS — G62.9 NEUROPATHY: ICD-10-CM

## 2022-11-23 DIAGNOSIS — R29.3 POSTURE ABNORMALITY: ICD-10-CM

## 2022-11-23 DIAGNOSIS — M54.2 CHRONIC NECK AND BACK PAIN: Primary | ICD-10-CM

## 2022-11-23 DIAGNOSIS — G89.29 CHRONIC NECK AND BACK PAIN: Primary | ICD-10-CM

## 2022-11-23 DIAGNOSIS — M54.9 CHRONIC NECK AND BACK PAIN: Primary | ICD-10-CM

## 2022-11-23 PROCEDURE — 97112 NEUROMUSCULAR REEDUCATION: CPT

## 2022-11-23 PROCEDURE — 97110 THERAPEUTIC EXERCISES: CPT

## 2022-11-23 NOTE — PLAN OF CARE
Physical Therapy Treatment Note     Name: Leni White  Clinic Number: [id number]    Therapy Diagnosis:   Encounter Diagnoses   Name Primary?    Chronic neck and back pain Yes    Neuropathy     Muscle weakness (generalized)     Posture abnormality        Physician: Kg Bowman FNP    Visit Date: 11/23/2022    Physician Orders: PT Eval and Treat  Medical Diagnosis from Referral: Chronic low back pain  Evaluation Date: 9/26/2022  Authorization Period Expiration: 12/26/2022  Plan of Care Expiration: 12/26/2022  Visit # / Visits authorized: 21/24    Time In: 1052  Time Out: 1145  Total Billable Time: 53 minutes    Surgery: None  Orthopedic Precautions: None  Pertinent History: GERD; states has been told be three different ortho MD that surgical intervention is necessary    Subjective     Patient reports: No changes. Would like to work on his low back today; states the pain was really bad yesterday but not as bad today and that cold weather negatively impacts his pain. PT largely has not improved his condition; some days pain is severe, some days it is manageable, which was pattern prior to PT.    CMS Impairment/Limitation/Restriction for FOTO Survey  Therapist reviewed FOTO scores for Leni White on 11/23/2022.   FOTO documents entered into [employer] - see Media section.    Eval Patient's Physical FS Primary Measure: 24  Eval Risk Adjusted Statistical FOTO: 45  Eval Limitation Score: 76%  Category: Body Position  Eval MDQ: 69.6% functional    10/12/22 Patient's Physical FS Primary Measure: 26  10/12/22 Risk Adjusted Statistical FOTO: 45  10/12/22 Limitation Score: 74%  Category: Body Position  10/12/22 MDQ: 67.6% functional    10/28/22 Patient's Physical FS Primary Measure: 24  10/28/22 Risk Adjusted Statistical FOTO: 45  10/28/22 Limitation Score: 76%  Category: Body Position  10/28/22 MDQ: 69.6% functional    11/11/22 Patient's Physical FS Primary Measure: 29 11/11/22 Risk Adjusted Statistical FOTO: 45  11/11/22  Limitation Score: 71%  Category: Body Position  11/11/22 MDQ: 64.3% functional    Objective     Leni received the following treatment:       Time Activities   Manual (Lumbar)  Lumbar rotation mobilizations (right gapping), left unilateral PAs (for right gapping), lumbar MFR and paraspinal STM, Biofreeze to low back and right scapula   TherAct  MHP to neck   TherEx 28 min NuStep, IFC with MHP to low back       Manual (Cervical)  Cervical manual traction, lateral right cervical glides entire spine, left stretch (levator, upper traps, scalenes), suboccipital release, PT-resisted exercise (bilateral lat flx, flexion)   Neuro Re-ed 25 min Russian stim to low back with (prone HS curl, prone glute sets, quadruped leg extension)   Dry Needling           Home Exercises Provided and Patient Education Provided     Education provided:   -Plan of care, cessation of valsava during core-intensive activities    Assessment     His results are short-lived and don't make any significant contributions to his function. Had long discussion with him regarding prognosis and he is in denial about condition and extent of pathology. Informed him that surgical intervention is likely needed and he continues to retort with continuity of stretching and exercise to eliminate symptoms. We came to decision to discontinue lumbar treatment and focus solely on cervical treatment given his relief of 3-4 days following PT focused on cervical spine. This will be final trial for PT here with this provider, and will refer back to PCP, as he is in need of surgical correction before symptoms worsen to irreparable damage 2/2 myelopathy.    Extensive conversation regarding lumbar pathology and potential need for surgical intervention. Mr. Farrar is unable to stand upright with severe pain in his back and to his right leg, thus suspect mechanical stenosis of some sort that has progressed (used to only be left sided); this paired with his full numbness to LLE, weakness  in legs that leads to multiple falls, and time since first diagnosed (2019), I feel that he would be much better suited with surgical intervention rather than conservative. Has also tried spinal injection and gotten no relief from it.     Patient prognosis is Fair.      Anticipated barriers to physical therapy: Language barrier    Goals: Ly Is progressing well towards his goals.  Short Term Goals: 6 weeks   Patient will report at least 10% disability reduction on MDQ to indicate clinically significant functional improvement  Patient will be able to ambulate with antalgic gait patter for full day using assistive device  Patient will report at least 10 point increase on FOTO survey to indicate clinically significant functional improvement     Long Term Goals: 12 weeks   Patient will report at least 20% disability reduction on MDQ to indicate clinically significant functional improvement  Patient will progress ambulation to device-free for half day each day  Patient will report at least 20 point increase on FOTO survey to indicate clinically significant functional improvement    Plan     2-3x/week x 12 weeks    Walter Aaron, PT

## 2022-11-28 ENCOUNTER — CLINICAL SUPPORT (OUTPATIENT)
Dept: REHABILITATION | Facility: HOSPITAL | Age: 47
End: 2022-11-28
Payer: MEDICAID

## 2022-11-28 DIAGNOSIS — M54.2 CHRONIC NECK AND BACK PAIN: Primary | ICD-10-CM

## 2022-11-28 DIAGNOSIS — R29.3 POSTURE ABNORMALITY: ICD-10-CM

## 2022-11-28 DIAGNOSIS — G89.29 CHRONIC NECK AND BACK PAIN: Primary | ICD-10-CM

## 2022-11-28 DIAGNOSIS — G62.9 NEUROPATHY: ICD-10-CM

## 2022-11-28 DIAGNOSIS — M54.9 CHRONIC NECK AND BACK PAIN: Primary | ICD-10-CM

## 2022-11-28 DIAGNOSIS — M62.81 MUSCLE WEAKNESS (GENERALIZED): ICD-10-CM

## 2022-11-28 PROCEDURE — 97140 MANUAL THERAPY 1/> REGIONS: CPT

## 2022-11-28 PROCEDURE — 97110 THERAPEUTIC EXERCISES: CPT

## 2022-11-28 NOTE — PLAN OF CARE
Physical Therapy Treatment Note     Name: Leni White  Clinic Number: [id number]    Therapy Diagnosis:   Encounter Diagnoses   Name Primary?    Chronic neck and back pain Yes    Neuropathy     Muscle weakness (generalized)     Posture abnormality        Physician: Kg Bowman FNP    Visit Date: 11/28/2022    Physician Orders: PT Eval and Treat  Medical Diagnosis from Referral: Chronic low back pain  Evaluation Date: 9/26/2022  Authorization Period Expiration: 12/26/2022  Plan of Care Expiration: 12/26/2022  Visit # / Visits authorized: 21/24    Time In: 1055  Time Out: 1150  Total Billable Time: 55 minutes    Surgery: None  Orthopedic Precautions: None  Pertinent History: GERD; states has been told be three different ortho MD that surgical intervention is necessary    Subjective     Patient reports: No changes. Would like to work on his low back today; states the pain was really bad yesterday but not as bad today and that cold weather negatively impacts his pain. PT largely has not improved his condition; some days pain is severe, some days it is manageable, which was pattern prior to PT.    CMS Impairment/Limitation/Restriction for FOTO Survey  Therapist reviewed FOTO scores for Leni White on 11/28/2022.   FOTO documents entered into [employer] - see Media section.    Eval Patient's Physical FS Primary Measure: 24  Eval Risk Adjusted Statistical FOTO: 45  Eval Limitation Score: 76%  Category: Body Position  Eval MDQ: 69.6% functional    10/12/22 Patient's Physical FS Primary Measure: 26  10/12/22 Risk Adjusted Statistical FOTO: 45  10/12/22 Limitation Score: 74%  Category: Body Position  10/12/22 MDQ: 67.6% functional    10/28/22 Patient's Physical FS Primary Measure: 24  10/28/22 Risk Adjusted Statistical FOTO: 45  10/28/22 Limitation Score: 76%  Category: Body Position  10/28/22 MDQ: 69.6% functional    11/11/22 Patient's Physical FS Primary Measure: 29 11/11/22 Risk Adjusted Statistical FOTO: 45  11/11/22  Limitation Score: 71%  Category: Body Position  11/11/22 MDQ: 64.3% functional    Objective     Leni received the following treatment:       Time Activities   Manual (Lumbar)  Lumbar rotation mobilizations (right gapping), left unilateral PAs (for right gapping), lumbar MFR and paraspinal STM, Biofreeze to low back and right scapula   TherAct  MHP to neck   TherEx 25 min IFC to left scapula/upper shoulder, MHP to neck       Manual (Cervical) 30 min Cervical manual traction, UT stretch passive (bilateral), assisted chin tuck (normal, w/ head lift), PT-resisted cervical isometric (lat flexion bilateral, flexion), suboccipital release, thoracic thrust manip (upper t-spine), left subscap release   Neuro Re-ed  Russian stim to low back with (prone HS curl, prone glute sets, quadruped leg extension)   Dry Needling           Home Exercises Provided and Patient Education Provided     Education provided:   -Plan of care    Assessment     Post session neck pain reduced to 0/10, no report of fatigue or adverse response. Aiming for long-lasting relief to gradually increase in duration. Symptoms of neck fatigue consistent with cervical instability and weakness of stabilizers. Working to maintain normal posture to help minimize mechanical stressors to cervical spine. Feel that he would benefit from trial of PT for cervical spine management, as previous treatments were largely spent on lumbar spine management.      Regarding lumbar spine  Mr. 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     Overall, his results of short-term  and mild pain relief, decreased radiculopathy 2/2 being in gravity-eliminated position from PT sessions are short-lived and don't make any significant contributions to his overall function. Had long discussion with him regarding prognosis and he appears to be in denial about his condition and extent of pathology. Informed him that surgical intervention is likely needed and he continues to retort with continuity of stretching and exercise to eliminate symptoms; also a language barrier present thus that may reduce efficacy of patient education. We came to decision to discontinue lumbar treatment and focus solely on cervical treatment. Given his relief of neck pain for 3-4 days following PT, we may see better results if we continue conservative measures. This will be final trial for PT here with this provider, followed by referral back to PCP; he is in need of surgical correction before symptoms worsen to irreparable damage 2/2 myelopathy.    Patient prognosis is Fair.      Anticipated barriers to physical therapy: Language barrier    Goals: Ly Is not progressing towards his goals.  Short Term Goals: 6 weeks - discontinued, none met  Patient will report at least 10% disability reduction on MDQ to indicate clinically significant functional improvement (discontinued)  Patient will be able to ambulate with antalgic gait pattern for full day using assistive device  Patient will report at least 10 point increase on FOTO survey to indicate clinically significant functional improvement       New goals for cervical spine - 6 weeks from 11/28/22  Patient will report 1/2 day with no neck pain during ADLs  Patient will demonstrate independence with HEP to allow for home management of pain and improved tolerance to ADLs    Plan     2-3x/week x 12 weeks    Walter Aaron, PT

## 2022-11-30 ENCOUNTER — CLINICAL SUPPORT (OUTPATIENT)
Dept: REHABILITATION | Facility: HOSPITAL | Age: 47
End: 2022-11-30
Payer: MEDICAID

## 2022-11-30 DIAGNOSIS — M62.81 MUSCLE WEAKNESS (GENERALIZED): ICD-10-CM

## 2022-11-30 DIAGNOSIS — R29.3 POSTURE ABNORMALITY: ICD-10-CM

## 2022-11-30 DIAGNOSIS — M54.2 CHRONIC NECK AND BACK PAIN: Primary | ICD-10-CM

## 2022-11-30 DIAGNOSIS — G89.29 CHRONIC NECK AND BACK PAIN: Primary | ICD-10-CM

## 2022-11-30 DIAGNOSIS — G62.9 NEUROPATHY: ICD-10-CM

## 2022-11-30 DIAGNOSIS — M54.9 CHRONIC NECK AND BACK PAIN: Primary | ICD-10-CM

## 2022-11-30 PROCEDURE — 97140 MANUAL THERAPY 1/> REGIONS: CPT

## 2022-11-30 PROCEDURE — 97110 THERAPEUTIC EXERCISES: CPT

## 2022-11-30 NOTE — PLAN OF CARE
Physical Therapy Treatment Note     Name: Leni White  Clinic Number: [id number]    Therapy Diagnosis:   Encounter Diagnoses   Name Primary?    Chronic neck and back pain Yes    Neuropathy     Muscle weakness (generalized)     Posture abnormality        Physician: Kg Bowman FNP    Visit Date: 11/30/2022    Physician Orders: PT Eval and Treat  Medical Diagnosis from Referral: Chronic low back pain  Evaluation Date: 9/26/2022  Authorization Period Expiration: 12/26/2022  Plan of Care Expiration: 12/26/2022  Visit # / Visits authorized: 23/24    Time In: 1057  Time Out: 1150  Total Billable Time: 53 minutes    Surgery: None  Orthopedic Precautions: None  Pertinent History: GERD; states has been told be three different ortho MD that surgical intervention is necessary    Subjective     Patient reports: Neck pain has reduced and his neck doesn't feel as tired as it had been feeling prior to PT. States he notes improvement in neck strength, mobility, and endurance.    CMS Impairment/Limitation/Restriction for FOTO Survey  Therapist reviewed FOTO scores for Leni White on 11/30/2022.   FOTO documents entered into [employer] - see Media section.  Eval Patient's Physical FS Primary Measure: 24  Eval Risk Adjusted Statistical FOTO: 45  Eval Limitation Score: 76%  Category: Body Position  Eval MDQ: 69.6% functional    10/12/22 Patient's Physical FS Primary Measure: 26  10/12/22 Risk Adjusted Statistical FOTO: 45  10/12/22 Limitation Score: 74%  Category: Body Position  10/12/22 MDQ: 67.6% functional    10/28/22 Patient's Physical FS Primary Measure: 24  10/28/22 Risk Adjusted Statistical FOTO: 45  10/28/22 Limitation Score: 76%  Category: Body Position  10/28/22 MDQ: 69.6% functional    11/11/22 Patient's Physical FS Primary Measure: 29  11/11/22 Risk Adjusted Statistical FOTO: 45  11/11/22 Limitation Score: 71%  Category: Body Position  11/11/22 MDQ: 64.3% functional    Objective     Leni received the following treatment:        Time Activities   Manual (Lumbar)  Lumbar rotation mobilizations (right gapping), left unilateral PAs (for right gapping), lumbar MFR and paraspinal STM, Biofreeze to low back and right scapula   TherAct  MHP to neck   TherEx 25 min IFC to left scapula/upper shoulder with MHP to neck, chin tuck/head lift, bilateral shld band (ER, horz abd @ 90)       Manual (Cervical) 28 min Cervical manual traction, UT stretch passive (left), assisted chin tuck, PT-resisted cervical isometric (lat flexion bilateral, flexion), suboccipital release, thoracic thrust manip (upper t-spine)   Neuro Re-ed  Russian stim to low back with (prone HS curl, prone glute sets, quadruped leg extension)   Dry Needling           Home Exercises Provided and Patient Education Provided     Education provided:   -Plan of care    Assessment     Post session neck pain again reduced to 0/10, and he reports there is no fatigue in his neck like when he used to have it. This has been his response since previous session 2 days ago, thus there is some long-lasting results with this course of intervention. We are aiming for continuity of long-lasting relief to gradually increase in duration. Symptoms of neck fatigue consistent with cervical instability and weakness of stabilizers. Working to maintain normal posture to help minimize mechanical stressors to cervical spine. Feel that he would benefit from trial of PT for cervical spine management, as previous treatments were largely spent on lumbar spine management.    Regarding lumbar spine  Mr. 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     Overall, his results of short-term and mild pain relief, decreased radiculopathy 2/2 being in gravity-eliminated position from PT sessions are short-lived and don't make any significant contributions to his overall function. Had long discussion with him regarding prognosis and he appears to be in denial about his condition and extent of pathology. Informed him that surgical intervention is likely needed and he continues to retort with continuity of stretching and exercise to eliminate symptoms; also a language barrier present thus that may reduce efficacy of patient education. We came to decision to discontinue lumbar treatment and focus solely on cervical treatment. Given his relief of neck pain for 3-4 days following PT, we may see better results if we continue conservative measures. This will be final trial for PT here with this provider, followed by referral back to PCP; he is in need of surgical correction before symptoms worsen to irreparable damage 2/2 myelopathy.    Patient prognosis is Fair.      Anticipated barriers to physical therapy: Language barrier    Goals: Ly Is not progressing towards his goals.  Short Term Goals: 6 weeks - discontinued, none met  Patient will report at least 10% disability reduction on MDQ to indicate clinically significant functional improvement (discontinued)  Patient will be able to ambulate with antalgic gait pattern for full day using assistive device  Patient will report at least 10 point increase on FOTO survey to indicate clinically significant functional improvement       New goals for cervical spine - 6 weeks from 11/28/22  Patient will report 1/2 day with no neck pain during ADLs  Patient will demonstrate independence with HEP to allow for home management of pain and improved tolerance to ADLs    Plan     2-3x/week x 12 weeks    Walter Aaron, PT

## 2022-12-02 ENCOUNTER — CLINICAL SUPPORT (OUTPATIENT)
Dept: REHABILITATION | Facility: HOSPITAL | Age: 47
End: 2022-12-02
Payer: MEDICAID

## 2022-12-02 DIAGNOSIS — R29.3 POSTURE ABNORMALITY: ICD-10-CM

## 2022-12-02 DIAGNOSIS — M54.2 CHRONIC NECK AND BACK PAIN: Primary | ICD-10-CM

## 2022-12-02 DIAGNOSIS — M54.9 CHRONIC NECK AND BACK PAIN: Primary | ICD-10-CM

## 2022-12-02 DIAGNOSIS — M62.81 MUSCLE WEAKNESS (GENERALIZED): ICD-10-CM

## 2022-12-02 DIAGNOSIS — G62.9 NEUROPATHY: ICD-10-CM

## 2022-12-02 DIAGNOSIS — G89.29 CHRONIC NECK AND BACK PAIN: Primary | ICD-10-CM

## 2022-12-02 PROCEDURE — 97140 MANUAL THERAPY 1/> REGIONS: CPT

## 2022-12-02 PROCEDURE — 97530 THERAPEUTIC ACTIVITIES: CPT

## 2022-12-02 NOTE — PLAN OF CARE
Physical Therapy Treatment Note     Name: Leni White  Clinic Number: [id number]    Therapy Diagnosis:   Encounter Diagnoses   Name Primary?    Chronic neck and back pain Yes    Neuropathy     Muscle weakness (generalized)     Posture abnormality        Physician: Kg Bowman FNP    Visit Date: 12/2/2022    Physician Orders: PT Eval and Treat  Medical Diagnosis from Referral: Chronic low back pain  Evaluation Date: 9/26/2022  Authorization Period Expiration: 12/26/2022  Plan of Care Expiration: 12/26/2022  Visit # / Visits authorized: 24/24    Time In: 1055  Time Out: 1155  Total Billable Time: 60 minutes    Surgery: None  Orthopedic Precautions: None  Pertinent History: GERD; states has been told be three different ortho MD that surgical intervention is necessary    Subjective     Patient reports: Has relief for 2-3 days, but having some tightness in left levator scap.    CMS Impairment/Limitation/Restriction for FOTO Survey  Therapist reviewed FOTO scores for Leni White on 12/2/2022.   FOTO documents entered into [employer] - see Media section.  Eval Patient's Physical FS Primary Measure: 40  Eval Risk Adjusted Statistical FOTO: 48  Eval Limitation Score: 60%  Category: Body Position  Eval NDI: 53.6% functional      Objective     Ly received the following treatment:       Time Activities   TherAct 26 min MHP to neck, IFC to left scapula, chin tuck/head lift   TherEx  , chin tuck/head lift, bilateral shld band (ER, horz abd @ 90)       Manual (Cervical) 34 min Cervical manual traction, left levator scap stretch passive and DTM, assisted chin tuck, PT-resisted cervical isometric (lat flexion bilateral, flexion), suboccipital release, thoracic thrust manip (upper t-spine), PT-resisted left shoulder (ER, IR, depression, horz row, ext; all done in supine)   Neuro Re-ed  Russian stim to low back with (prone HS curl, prone glute sets, quadruped leg extension)   Dry Needling           Home Exercises Provided and Patient  Education Provided     Education provided:   -Plan of care    Assessment     Post session neck pain again reduced to 0/10, and he reports there is no fatigue or pain in levator scap. This has been his response each session when focused on cervical spine, and there is long-lasting results with this course of intervention. We are aiming for continuity of long-lasting relief to gradually increase in duration. Symptoms of neck fatigue consistent with cervical instability and weakness of stabilizers. Working to maintain normal posture to help minimize mechanical stressors to cervical spine. Feel that he would benefit from trial of PT for cervical spine management, as previous treatments were largely spent on lumbar spine management.    Regarding lumbar spine  Mr. 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     Overall, his results of short-term and mild pain relief, decreased radiculopathy 2/2 being in gravity-eliminated position from PT sessions are short-lived and don't make any significant contributions to his overall function. Had long discussion with him regarding prognosis and he appears to be in denial about his condition and extent of pathology. Informed him that surgical intervention is likely needed and he continues to retort with continuity of stretching and exercise to eliminate symptoms; also a language barrier present thus that may reduce efficacy of patient education. We came to decision to discontinue lumbar treatment and focus solely on cervical treatment. Given his relief of neck pain for 3-4  days following PT, we may see better results if we continue conservative measures. This will be final trial for PT here with this provider, followed by referral back to PCP; he is in need of surgical correction before symptoms worsen to irreparable damage 2/2 myelopathy.    Patient prognosis is Fair.      Anticipated barriers to physical therapy: Language barrier    Goals: Ly Is not progressing towards his goals.  Short Term Goals: 6 weeks - discontinued, none met  Patient will report at least 10% disability reduction on MDQ to indicate clinically significant functional improvement (discontinued)  Patient will be able to ambulate with antalgic gait pattern for full day using assistive device  Patient will report at least 10 point increase on FOTO survey to indicate clinically significant functional improvement       New goals for cervical spine - 6 weeks from 11/28/22  Patient will report 1/2 day with no neck pain during ADLs  Patient will demonstrate independence with HEP to allow for home management of pain and improved tolerance to ADLs  Patient will report at least 10% disability reduction on NDI to indicate clinically significant functional improvement    Plan     2-3x/week x 12 weeks    Walter Aaron, PT